# Patient Record
Sex: MALE | Race: OTHER | NOT HISPANIC OR LATINO
[De-identification: names, ages, dates, MRNs, and addresses within clinical notes are randomized per-mention and may not be internally consistent; named-entity substitution may affect disease eponyms.]

---

## 2018-04-01 PROBLEM — Z00.00 ENCOUNTER FOR PREVENTIVE HEALTH EXAMINATION: Status: ACTIVE | Noted: 2018-04-01

## 2018-05-02 ENCOUNTER — APPOINTMENT (OUTPATIENT)
Dept: UROLOGY | Facility: CLINIC | Age: 68
End: 2018-05-02
Payer: COMMERCIAL

## 2018-05-02 VITALS
WEIGHT: 175 LBS | DIASTOLIC BLOOD PRESSURE: 66 MMHG | BODY MASS INDEX: 26.52 KG/M2 | SYSTOLIC BLOOD PRESSURE: 122 MMHG | HEART RATE: 68 BPM | HEIGHT: 68 IN

## 2018-05-02 DIAGNOSIS — Z12.5 ENCOUNTER FOR SCREENING FOR MALIGNANT NEOPLASM OF PROSTATE: ICD-10-CM

## 2018-05-02 DIAGNOSIS — R39.12 POOR URINARY STREAM: ICD-10-CM

## 2018-05-02 DIAGNOSIS — F41.9 ANXIETY DISORDER, UNSPECIFIED: ICD-10-CM

## 2018-05-02 DIAGNOSIS — Z87.891 PERSONAL HISTORY OF NICOTINE DEPENDENCE: ICD-10-CM

## 2018-05-02 DIAGNOSIS — Z78.9 OTHER SPECIFIED HEALTH STATUS: ICD-10-CM

## 2018-05-02 PROCEDURE — 99204 OFFICE O/P NEW MOD 45 MIN: CPT

## 2018-05-02 RX ORDER — BUSPIRONE HYDROCHLORIDE 10 MG/1
10 TABLET ORAL
Qty: 60 | Refills: 0 | Status: DISCONTINUED | COMMUNITY
Start: 2018-03-01

## 2018-05-02 RX ORDER — SIMVASTATIN 40 MG/1
40 TABLET, FILM COATED ORAL
Qty: 30 | Refills: 0 | Status: DISCONTINUED | COMMUNITY
Start: 2018-01-15

## 2018-05-02 RX ORDER — FINASTERIDE 1 MG/1
TABLET ORAL
Refills: 0 | Status: ACTIVE | COMMUNITY

## 2018-05-02 RX ORDER — TAMSULOSIN HYDROCHLORIDE 0.4 MG/1
0.4 CAPSULE ORAL
Qty: 30 | Refills: 0 | Status: DISCONTINUED | COMMUNITY
Start: 2018-01-26

## 2018-05-02 RX ORDER — TAMSULOSIN HYDROCHLORIDE 0.4 MG/1
CAPSULE ORAL
Refills: 0 | Status: ACTIVE | COMMUNITY

## 2018-05-02 RX ORDER — ATENOLOL 50 MG/1
50 TABLET ORAL
Qty: 60 | Refills: 0 | Status: DISCONTINUED | COMMUNITY
Start: 2018-01-21

## 2018-05-02 RX ORDER — OMEPRAZOLE 40 MG/1
40 CAPSULE, DELAYED RELEASE ORAL
Qty: 30 | Refills: 0 | Status: DISCONTINUED | COMMUNITY
Start: 2018-02-20

## 2018-05-02 RX ORDER — FINASTERIDE 5 MG/1
5 TABLET, FILM COATED ORAL
Qty: 30 | Refills: 0 | Status: DISCONTINUED | COMMUNITY
Start: 2018-01-15

## 2018-05-02 NOTE — PHYSICAL EXAM
[General Appearance - Well Developed] : well developed [General Appearance - Well Nourished] : well nourished [Normal Appearance] : normal appearance [Well Groomed] : well groomed [General Appearance - In No Acute Distress] : no acute distress [Edema] : no peripheral edema [Respiration, Rhythm And Depth] : normal respiratory rhythm and effort [Exaggerated Use Of Accessory Muscles For Inspiration] : no accessory muscle use [Abdomen Soft] : soft [Abdomen Tenderness] : non-tender [Costovertebral Angle Tenderness] : no ~M costovertebral angle tenderness [No Prostate Nodules] : no prostate nodules [Prostate Size ___ gm] : prostate size [unfilled] gm [Normal Station and Gait] : the gait and station were normal for the patient's age [Skin Color & Pigmentation] : normal skin color and pigmentation [] : no rash [No Focal Deficits] : no focal deficits [Oriented To Time, Place, And Person] : oriented to person, place, and time [Affect] : the affect was normal [Mood] : the mood was normal [Not Anxious] : not anxious

## 2018-05-02 NOTE — HISTORY OF PRESENT ILLNESS
[FreeTextEntry1] : This is a 67 year male who presents for evaluation of BPH with LUTS.  Was told prostate was large by GI who saw on colonoscopy. Has taken flomax and finasteride for years. \par \par He does complain of Nocturia, frequency and urgency and weak stream.\par \par IPSS: Total = 16\par 1.incomplete emptyin\par 2. frequency:3\par 3. intermittency:2\par 4. urgency:4\par 5. weak stream: 3\par 6. Strainin\par 7. nocturia:1\par \par QOL: Mixed\par \par No family history of proatate cancer\par does not smoke\par no sexual dysfunction

## 2018-05-02 NOTE — LETTER BODY
[Dear  ___] : Dear  [unfilled], [Consult Letter:] : I had the pleasure of evaluating your patient, [unfilled]. [Please see my note below.] : Please see my note below. [Consult Closing:] : Thank you very much for allowing me to participate in the care of this patient.  If you have any questions, please do not hesitate to contact me. [Sincerely,] : Sincerely, [FreeTextEntry3] : Royal Franz

## 2018-05-30 ENCOUNTER — APPOINTMENT (OUTPATIENT)
Dept: UROLOGY | Facility: CLINIC | Age: 68
End: 2018-05-30
Payer: COMMERCIAL

## 2018-05-30 DIAGNOSIS — N40.0 BENIGN PROSTATIC HYPERPLASIA WITHOUT LOWER URINARY TRACT SYMPMS: ICD-10-CM

## 2018-05-30 DIAGNOSIS — R35.1 NOCTURIA: ICD-10-CM

## 2018-05-30 DIAGNOSIS — N31.8 OTHER NEUROMUSCULAR DYSFUNCTION OF BLADDER: ICD-10-CM

## 2018-05-30 PROCEDURE — 76872 US TRANSRECTAL: CPT

## 2018-06-06 ENCOUNTER — APPOINTMENT (OUTPATIENT)
Dept: UROLOGY | Facility: CLINIC | Age: 68
End: 2018-06-06

## 2018-06-07 PROBLEM — R35.1 NOCTURIA MORE THAN TWICE PER NIGHT: Status: ACTIVE | Noted: 2018-05-02

## 2018-06-07 PROBLEM — N31.8 FREQUENCY-URGENCY SYNDROME: Status: ACTIVE | Noted: 2018-05-02

## 2018-06-07 PROBLEM — N40.0 ENLARGED PROSTATE: Status: ACTIVE | Noted: 2018-05-02

## 2018-07-31 ENCOUNTER — APPOINTMENT (OUTPATIENT)
Dept: UROLOGY | Facility: CLINIC | Age: 68
End: 2018-07-31

## 2018-11-04 ENCOUNTER — INPATIENT (INPATIENT)
Facility: HOSPITAL | Age: 68
LOS: 1 days | Discharge: HOME | End: 2018-11-06
Attending: INTERNAL MEDICINE | Admitting: INTERNAL MEDICINE
Payer: COMMERCIAL

## 2018-11-04 VITALS
DIASTOLIC BLOOD PRESSURE: 88 MMHG | RESPIRATION RATE: 18 BRPM | TEMPERATURE: 98 F | SYSTOLIC BLOOD PRESSURE: 165 MMHG | HEART RATE: 53 BPM | OXYGEN SATURATION: 99 % | WEIGHT: 175.05 LBS | HEIGHT: 68 IN

## 2018-11-04 DIAGNOSIS — N40.0 BENIGN PROSTATIC HYPERPLASIA WITHOUT LOWER URINARY TRACT SYMPTOMS: ICD-10-CM

## 2018-11-04 DIAGNOSIS — F32.9 MAJOR DEPRESSIVE DISORDER, SINGLE EPISODE, UNSPECIFIED: ICD-10-CM

## 2018-11-04 DIAGNOSIS — I10 ESSENTIAL (PRIMARY) HYPERTENSION: ICD-10-CM

## 2018-11-04 DIAGNOSIS — R07.9 CHEST PAIN, UNSPECIFIED: ICD-10-CM

## 2018-11-04 LAB
ALBUMIN SERPL ELPH-MCNC: 4.7 G/DL — SIGNIFICANT CHANGE UP (ref 3.5–5.2)
ALP SERPL-CCNC: 72 U/L — SIGNIFICANT CHANGE UP (ref 30–115)
ALT FLD-CCNC: 37 U/L — SIGNIFICANT CHANGE UP (ref 0–41)
ANION GAP SERPL CALC-SCNC: 14 MMOL/L — SIGNIFICANT CHANGE UP (ref 7–14)
AST SERPL-CCNC: 29 U/L — SIGNIFICANT CHANGE UP (ref 0–41)
BILIRUB SERPL-MCNC: 0.4 MG/DL — SIGNIFICANT CHANGE UP (ref 0.2–1.2)
BUN SERPL-MCNC: 17 MG/DL — SIGNIFICANT CHANGE UP (ref 10–20)
CALCIUM SERPL-MCNC: 9.2 MG/DL — SIGNIFICANT CHANGE UP (ref 8.5–10.1)
CHLORIDE SERPL-SCNC: 103 MMOL/L — SIGNIFICANT CHANGE UP (ref 98–110)
CO2 SERPL-SCNC: 26 MMOL/L — SIGNIFICANT CHANGE UP (ref 17–32)
CREAT SERPL-MCNC: 1.1 MG/DL — SIGNIFICANT CHANGE UP (ref 0.7–1.5)
GLUCOSE SERPL-MCNC: 138 MG/DL — HIGH (ref 70–99)
HCT VFR BLD CALC: 44 % — SIGNIFICANT CHANGE UP (ref 42–52)
HGB BLD-MCNC: 13.7 G/DL — LOW (ref 14–18)
MCHC RBC-ENTMCNC: 22.4 PG — LOW (ref 27–31)
MCHC RBC-ENTMCNC: 31.1 G/DL — LOW (ref 32–37)
MCV RBC AUTO: 71.9 FL — LOW (ref 80–94)
NRBC # BLD: 0 /100 WBCS — SIGNIFICANT CHANGE UP (ref 0–0)
PLATELET # BLD AUTO: 200 K/UL — SIGNIFICANT CHANGE UP (ref 130–400)
POTASSIUM SERPL-MCNC: 4.6 MMOL/L — SIGNIFICANT CHANGE UP (ref 3.5–5)
POTASSIUM SERPL-SCNC: 4.6 MMOL/L — SIGNIFICANT CHANGE UP (ref 3.5–5)
PROT SERPL-MCNC: 7.4 G/DL — SIGNIFICANT CHANGE UP (ref 6–8)
RBC # BLD: 6.12 M/UL — HIGH (ref 4.7–6.1)
RBC # FLD: 15.9 % — HIGH (ref 11.5–14.5)
SODIUM SERPL-SCNC: 143 MMOL/L — SIGNIFICANT CHANGE UP (ref 135–146)
TROPONIN T SERPL-MCNC: <0.01 NG/ML — SIGNIFICANT CHANGE UP
WBC # BLD: 6.57 K/UL — SIGNIFICANT CHANGE UP (ref 4.8–10.8)
WBC # FLD AUTO: 6.57 K/UL — SIGNIFICANT CHANGE UP (ref 4.8–10.8)

## 2018-11-04 RX ORDER — ASPIRIN/CALCIUM CARB/MAGNESIUM 324 MG
325 TABLET ORAL DAILY
Qty: 0 | Refills: 0 | Status: DISCONTINUED | OUTPATIENT
Start: 2018-11-04 | End: 2018-11-05

## 2018-11-04 RX ORDER — NITROGLYCERIN 6.5 MG
0.4 CAPSULE, EXTENDED RELEASE ORAL
Qty: 0 | Refills: 0 | Status: COMPLETED | OUTPATIENT
Start: 2018-11-04 | End: 2018-11-05

## 2018-11-04 RX ADMIN — Medication 0.4 MILLIGRAM(S): at 22:18

## 2018-11-04 RX ADMIN — Medication 325 MILLIGRAM(S): at 22:18

## 2018-11-04 NOTE — ED ADULT TRIAGE NOTE - CHIEF COMPLAINT QUOTE
I am having chest pain. It starts in my left shoulder and moves into the middle.  This has been going on for months and tonight the pain became severe.

## 2018-11-04 NOTE — ED ADULT NURSE NOTE - OBJECTIVE STATEMENT
chest pain starting in left arm and radiating to middle of chest. Pain began months ago and is intermittant.  Tonight pain was worse.

## 2018-11-04 NOTE — ED PROVIDER NOTE - OBJECTIVE STATEMENT
68y male with h/o HTN, DLD, GERD, and anxiety with remote smoking hx, no fhx cad, no PE rf, presents with years of chest pain, pain mostly in left shoulder, occasionally with numbness in left arm and left leg, again for years, numbness attributed to pinched nerves in neck and back, however c/o worsening pain with occasional SOB, had stress test which was apparently abnormal and given rx for CCTA as outpatient, 3 hours ago felt the pain again, 7/10, asked his wife to bring him to hospital, still has the pain, no sob, no orthopnea, no change in ET, no cough, no feer, no melena

## 2018-11-04 NOTE — H&P ADULT - NSHPPHYSICALEXAM_GEN_ALL_CORE
GENERAL:  69y/o Male NAD, resting comfortably.  HEAD:  Atraumatic, Normocephalic  EYES: EOMI, PERRLA, conjunctiva and sclera clear  NECK: Supple, No JVD, no cervical lymphadenopathy, non-tender  CHEST/LUNG: Clear to auscultation bilaterally; No wheeze, rhonchi, or rales  HEART: Regular rate and rhythm; S1&S2  ABDOMEN: Soft, Nontender, Nondistended x 4 quadrants; Bowel sounds present  EXTREMITIES:   Peripheral Pulses Present, No clubbing, no cyanosis, or no edema, no calf tenderness  PSYCH: AAOx3, cooperative, appropriate  NEUROLOGY: WNL  SKIN: WNL

## 2018-11-04 NOTE — ED PROVIDER NOTE - PHYSICAL EXAMINATION
Vital Signs: I have reviewed the initial vital signs.  Constitutional: well-nourished, appears stated age, no acute distress  HEENT: nc/at, perrla, conj pink mmm  Neck: supple no JVD  Cardiovascular: RRR no m/r/g  Respiratory: CTA no w/r/r  Gastrointestinal: +bs, snt/nd  Musculoskeletal: FROM x 4 pulses equal  Integumentary: warm, dry, no rash  Neurologic: awake, alert, cranial nerves II-XII grossly intact, extremities’ motor and sensory functions grossly intact  Psychiatric: appropriate mood, appropriate affect

## 2018-11-04 NOTE — H&P ADULT - NSHPLABSRESULTS_GEN_ALL_CORE
13.7   6.57  )-----------( 200      ( 04 Nov 2018 22:20 )             44.0       11-04    143  |  103  |  17  ----------------------------<  138<H>  4.6   |  26  |  1.1    Ca    9.2      04 Nov 2018 22:20    TPro  7.4  /  Alb  4.7  /  TBili  0.4  /  DBili  x   /  AST  29  /  ALT  37  /  AlkPhos  72  11-04                      Lactate Trend      CARDIAC MARKERS ( 04 Nov 2018 22:20 )  x     / <0.01 ng/mL / x     / x     / x            CAPILLARY BLOOD GLUCOSE

## 2018-11-04 NOTE — ED ADULT NURSE NOTE - NSIMPLEMENTINTERV_GEN_ALL_ED
Implemented All Universal Safety Interventions:  [place] to call system. Call bell, personal items and telephone within reach. Instruct patient to call for assistance. Room bathroom lighting operational. Non-slip footwear when patient is off stretcher. Physically safe environment: no spills, clutter or unnecessary equipment. Stretcher in lowest position, wheels locked, appropriate side rails in place.

## 2018-11-04 NOTE — ED PROVIDER NOTE - MEDICAL DECISION MAKING DETAILS
Pt with CP, per family with recent abnormal stress test referred for CCTA, PE as above, labs and studies reviewed, admitted to telemetry

## 2018-11-04 NOTE — H&P ADULT - HISTORY OF PRESENT ILLNESS
· Chief Complaint: The patient is a 68y Male complaining of chest pain.	  · HPI Objective Statement: 68y male with h/o HTN, DLD, GERD, and anxiety with remote smoking hx, no fhx cad, no PE rf, presents with years of chest pain, pain mostly in left shoulder, occasionally with numbness in left arm and left leg, again for years, numbness attributed to pinched nerves in neck and back, however c/o worsening pain with occasional SOB, had stress test which was apparently abnormal and given rx for CCTA as outpatient, 3 hours ago felt the pain again, 7/10, asked his wife to bring him to hospital, still has the pain, no sob, no orthopnea, no change in ET, no cough, no feer, no melena

## 2018-11-05 LAB
CK MB CFR SERPL CALC: 1.1 NG/ML — SIGNIFICANT CHANGE UP (ref 0.6–6.3)
CK MB CFR SERPL CALC: 1.6 NG/ML — SIGNIFICANT CHANGE UP (ref 0.6–6.3)
CK SERPL-CCNC: 134 U/L — SIGNIFICANT CHANGE UP (ref 0–225)
CK SERPL-CCNC: 163 U/L — SIGNIFICANT CHANGE UP (ref 0–225)
TROPONIN T SERPL-MCNC: <0.01 NG/ML — SIGNIFICANT CHANGE UP
TROPONIN T SERPL-MCNC: <0.01 NG/ML — SIGNIFICANT CHANGE UP

## 2018-11-05 PROCEDURE — 93880 EXTRACRANIAL BILAT STUDY: CPT | Mod: 26

## 2018-11-05 RX ORDER — PANTOPRAZOLE SODIUM 20 MG/1
40 TABLET, DELAYED RELEASE ORAL ONCE
Qty: 0 | Refills: 0 | Status: COMPLETED | OUTPATIENT
Start: 2018-11-05 | End: 2018-11-05

## 2018-11-05 RX ORDER — ASPIRIN/CALCIUM CARB/MAGNESIUM 324 MG
325 TABLET ORAL DAILY
Qty: 0 | Refills: 0 | Status: DISCONTINUED | OUTPATIENT
Start: 2018-11-05 | End: 2018-11-06

## 2018-11-05 RX ORDER — NITROGLYCERIN 6.5 MG
0.4 CAPSULE, EXTENDED RELEASE ORAL ONCE
Qty: 0 | Refills: 0 | Status: COMPLETED | OUTPATIENT
Start: 2018-11-05 | End: 2018-11-05

## 2018-11-05 RX ORDER — HEPARIN SODIUM 5000 [USP'U]/ML
5000 INJECTION INTRAVENOUS; SUBCUTANEOUS EVERY 12 HOURS
Qty: 0 | Refills: 0 | Status: DISCONTINUED | OUTPATIENT
Start: 2018-11-05 | End: 2018-11-06

## 2018-11-05 RX ORDER — PANTOPRAZOLE SODIUM 20 MG/1
40 TABLET, DELAYED RELEASE ORAL
Qty: 0 | Refills: 0 | Status: DISCONTINUED | OUTPATIENT
Start: 2018-11-05 | End: 2018-11-06

## 2018-11-05 RX ORDER — ALPRAZOLAM 0.25 MG
1 TABLET ORAL ONCE
Qty: 0 | Refills: 0 | Status: DISCONTINUED | OUTPATIENT
Start: 2018-11-05 | End: 2018-11-05

## 2018-11-05 RX ORDER — ACETAMINOPHEN 500 MG
650 TABLET ORAL EVERY 8 HOURS
Qty: 0 | Refills: 0 | Status: DISCONTINUED | OUTPATIENT
Start: 2018-11-05 | End: 2018-11-06

## 2018-11-05 RX ADMIN — Medication 325 MILLIGRAM(S): at 11:50

## 2018-11-05 RX ADMIN — Medication 1 MILLIGRAM(S): at 12:22

## 2018-11-05 RX ADMIN — PANTOPRAZOLE SODIUM 40 MILLIGRAM(S): 20 TABLET, DELAYED RELEASE ORAL at 09:10

## 2018-11-05 RX ADMIN — Medication 30 MILLILITER(S): at 09:00

## 2018-11-05 RX ADMIN — Medication 0.4 MILLIGRAM(S): at 08:21

## 2018-11-05 RX ADMIN — Medication 0.4 MILLIGRAM(S): at 08:36

## 2018-11-05 RX ADMIN — HEPARIN SODIUM 5000 UNIT(S): 5000 INJECTION INTRAVENOUS; SUBCUTANEOUS at 18:50

## 2018-11-05 NOTE — CONSULT NOTE ADULT - SUBJECTIVE AND OBJECTIVE BOX
CARDIOLOGY CONSULT NOTE     CHIEF COMPLAINT/REASON FOR CONSULT:    HPI:  · Chief Complaint: The patient is a 68y Male complaining of chest pain.	  · HPI Objective Statement: 68y male with h/o HTN, DLD, GERD, and anxiety with remote smoking hx, no fhx cad, no PE rf, presents with years of chest pain, pain mostly in left shoulder, occasionally with numbness in left arm and left leg, again for years, numbness attributed to pinched nerves in neck and back, however c/o worsening pain with occasional SOB, had stress test which was apparently abnormal and given rx for CCTA as outpatient, 3 hours ago felt the pain again, 7/10, asked his wife to bring him to hospital,  no sob, no orthopnea, no change in ET, no cough, no feer, no melena (04 Nov 2018 23:53)      PAST MEDICAL & SURGICAL HISTORY:  Depression  Hypertension  BPH (benign prostatic hyperplasia)      Cardiac Risks:   [ x]HTN, [x ] DM, [ ] Smoking, [ ] FH,  [ x] Lipids        MEDICATIONS:  MEDICATIONS  (STANDING):  aspirin 325 milliGRAM(s) Oral daily  heparin  Injectable 5000 Unit(s) SubCutaneous every 12 hours  pantoprazole    Tablet 40 milliGRAM(s) Oral before breakfast      FAMILY HISTORY:      SOCIAL HISTORY:      [ ] Marital status    Allergies    No Known Allergies        	    REVIEW OF SYSTEMS:  CONSTITUTIONAL: No fever, weight loss, or fatigue  EYES: No eye pain, visual disturbances, or discharge  ENMT:  No difficulty hearing, tinnitus, vertigo; No sinus or throat pain  NECK: No pain or stiffness  RESPIRATORY: No cough, wheezing, chills or hemoptysis; No Shortness of Breath  CARDIOVASCULAR: No chest pain, palpitations, passing out, dizziness, or leg swelling  GASTROINTESTINAL: No abdominal or epigastric pain. No nausea, vomiting, or hematemesis; No diarrhea or constipation. No melena or hematochezia.  GENITOURINARY: No dysuria, frequency, hematuria, or incontinence  NEUROLOGICAL: No headaches, memory loss, loss of strength, numbness, or tremors  SKIN: No itching, burning, rashes, or lesions   	      PHYSICAL EXAM:  T(C): 36 (11-05-18 @ 08:21), Max: 36.6 (11-04-18 @ 21:14)  HR: 56 (11-05-18 @ 08:51) (47 - 60)  BP: 159/89 (11-05-18 @ 08:51) (130/78 - 196/96)  RR: 16 (11-05-18 @ 08:21) (16 - 18)  SpO2: 100% (11-05-18 @ 06:01) (96% - 100%)  Wt(kg): --  I&O's Summary    04 Nov 2018 07:01  -  05 Nov 2018 07:00  --------------------------------------------------------  IN: 0 mL / OUT: 1500 mL / NET: -1500 mL        Appearance: Normal	  Psychiatry: A & O x 3, Mood & affect appropriate  HEENT:   Normal oral mucosa, PERRL, EOMI	  Lymphatic: No lymphadenopathy  Cardiovascular: Normal S1 S2,RRR, No JVD, No murmurs  Respiratory: Lungs clear to auscultation	  Gastrointestinal:  Soft, Non-tender, + BS	  Skin: No rashes, No ecchymoses, No cyanosis	  Neurologic: Non-focal  Extremities: Normal range of motion, No clubbing, cyanosis or edema  Vascular: Peripheral pulses palpable 2+ bilaterally      ECG:  	sb    	  LABS:	 	    CARDIAC MARKERS:                                    13.7   6.57  )-----------( 200      ( 04 Nov 2018 22:20 )             44.0     11-04    143  |  103  |  17  ----------------------------<  138<H>  4.6   |  26  |  1.1    Ca    9.2      04 Nov 2018 22:20    TPro  7.4  /  Alb  4.7  /  TBili  0.4  /  DBili  x   /  AST  29  /  ALT  37  /  AlkPhos  72  11-04

## 2018-11-06 ENCOUNTER — TRANSCRIPTION ENCOUNTER (OUTPATIENT)
Age: 68
End: 2018-11-06

## 2018-11-06 VITALS — HEIGHT: 68 IN | WEIGHT: 174.61 LBS

## 2018-11-06 LAB — GLUCOSE BLDC GLUCOMTR-MCNC: 183 MG/DL — HIGH (ref 70–99)

## 2018-11-06 RX ORDER — ASPIRIN/CALCIUM CARB/MAGNESIUM 324 MG
1 TABLET ORAL
Qty: 0 | Refills: 0 | DISCHARGE
Start: 2018-11-06

## 2018-11-06 RX ADMIN — PANTOPRAZOLE SODIUM 40 MILLIGRAM(S): 20 TABLET, DELAYED RELEASE ORAL at 05:49

## 2018-11-06 RX ADMIN — HEPARIN SODIUM 5000 UNIT(S): 5000 INJECTION INTRAVENOUS; SUBCUTANEOUS at 05:48

## 2018-11-06 NOTE — DISCHARGE NOTE ADULT - PATIENT PORTAL LINK FT
You can access the [facility] Patient Portal, offered by [facility], by registering with the following website: [email or web address]

## 2018-11-06 NOTE — DISCHARGE NOTE ADULT - HOSPITAL COURSE
· Chief Complaint: The patient is a 68y Male complaining of chest pain.	  · HPI Objective Statement: 68y male with h/o HTN, DLD, GERD, and anxiety with remote smoking hx, no fhx cad, no PE rf, presents with years of chest pain, pain mostly in left shoulder, occasionally with numbness in left arm and left leg, again for years, numbness attributed to pinched nerves in neck and back, however c/o worsening pain with occasional SOB, had stress test which was apparently abnormal and given rx for CCTA as outpatient, 3 hours ago felt the pain again, 7/10, asked his wife to bring him to hospital, still has the pain, no sob, no orthopnea, no change in ET, no cough, no feer, no melena	    PAST MEDICAL/SURGICAL/FAMILY/SOCIAL HISTORY:   Past Medical History:  BPH (benign prostatic hyperplasia)    Depression    Hypertension.    Tobacco Usage:  · Tobacco Usage	Former smoker	    ALLERGIES AND HOME MEDICATIONS:   Allergies:        Allergies:  	No Known Allergies:     Home Medications:   * Patient Currently Takes Medications as of 04-Nov-2018 21:28 documented in Structured Notes  · 	simvastatin 40 mg oral tablet: Last Dose Taken:  , 1 tab(s) orally once a day (at bedtime)  · 	tamsulosin 0.4 mg oral capsule: Last Dose Taken:  , 1 cap(s) orally once a day  · 	finasteride 5 mg oral tablet: Last Dose Taken:  , 1 tab(s) orally once a day  · 	atenolol 50 mg oral tablet: Last Dose Taken:  , 1 tab(s) orally once a day  · 	omeprazole 40 mg oral delayed release capsule: Last Dose Taken:  , 1 cap(s) orally once a day  · 	busPIRone 10 mg oral tablet: Last Dose Taken:  , 1 tab(s) orally 2 times a day        patient Had CTA --moderate Lad occlusion    patient will see patient in [place]

## 2018-11-06 NOTE — PROGRESS NOTE ADULT - SUBJECTIVE AND OBJECTIVE BOX
KAIDEN SAGE  68y  Male    Patient is a 68y old  Male who presents with a chief complaint of cp (05 Nov 2018 10:32)    ALLERGIES:  No Known Allergies      INTERVAL HPI/OVERNIGHT EVENTS:    VITALS:  T(F): 96.2 (11-06-18 @ 06:00), Max: 97.8 (11-05-18 @ 16:07)  HR: 56 (11-06-18 @ 06:00) (51 - 56)  BP: 148/56 (11-06-18 @ 06:00) (126/69 - 160/85)  RR: 16 (11-06-18 @ 06:00) (16 - 17)  SpO2: 96% (11-05-18 @ 16:07) (96% - 96%)    LABS:  11-04    143  |  103  |  17  ----------------------------<  138<H>  4.6   |  26  |  1.1    Ca    9.2      04 Nov 2018 22:20    TPro  7.4  /  Alb  4.7  /  TBili  0.4  /  DBili  x   /  AST  29  /  ALT  37  /  AlkPhos  72  11-04    MICROBIOLOGY:    MEDICATION:  acetaminophen   Tablet .. 650 milliGRAM(s) Oral every 8 hours PRN  aluminum hydroxide/magnesium hydroxide/simethicone Suspension 30 milliLiter(s) Oral every 4 hours PRN  aspirin 325 milliGRAM(s) Oral daily  heparin  Injectable 5000 Unit(s) SubCutaneous every 12 hours  pantoprazole    Tablet 40 milliGRAM(s) Oral before breakfast    RADIOLOGY & ADDITIONAL TESTS:

## 2018-11-06 NOTE — DISCHARGE NOTE ADULT - MEDICATION SUMMARY - MEDICATIONS TO TAKE
I will START or STAY ON the medications listed below when I get home from the hospital:    finasteride 5 mg oral tablet  -- 1 tab(s) by mouth once a day  -- Indication: For BPH (benign prostatic hyperplasia)    aspirin 325 mg oral tablet  -- 1 tab(s) by mouth once a day  -- Indication: For Cad    tamsulosin 0.4 mg oral capsule  -- 1 cap(s) by mouth once a day  -- Indication: For BPH (benign prostatic hyperplasia)    simvastatin 40 mg oral tablet  -- 1 tab(s) by mouth once a day (at bedtime)  -- Indication: For Cholesterol    busPIRone 10 mg oral tablet  -- 1 tab(s) by mouth 2 times a day  -- Indication: For anxiety    atenolol 50 mg oral tablet  -- 1 tab(s) by mouth once a day  -- Indication: For Hypertension    omeprazole 40 mg oral delayed release capsule  -- 1 cap(s) by mouth once a day  -- Indication: For gerd

## 2018-11-06 NOTE — DISCHARGE NOTE ADULT - CARE PLAN
Principal Discharge DX:	Coronary artery disease with other form of angina pectoris  Goal:	advised to see cardiology  Assessment and plan of treatment:	patient have moderate LAD occlusion

## 2018-11-09 DIAGNOSIS — N40.0 BENIGN PROSTATIC HYPERPLASIA WITHOUT LOWER URINARY TRACT SYMPTOMS: ICD-10-CM

## 2018-11-09 DIAGNOSIS — Z87.891 PERSONAL HISTORY OF NICOTINE DEPENDENCE: ICD-10-CM

## 2018-11-09 DIAGNOSIS — K21.9 GASTRO-ESOPHAGEAL REFLUX DISEASE WITHOUT ESOPHAGITIS: ICD-10-CM

## 2018-11-09 DIAGNOSIS — I25.118 ATHEROSCLEROTIC HEART DISEASE OF NATIVE CORONARY ARTERY WITH OTHER FORMS OF ANGINA PECTORIS: ICD-10-CM

## 2018-11-09 DIAGNOSIS — R07.9 CHEST PAIN, UNSPECIFIED: ICD-10-CM

## 2018-11-09 DIAGNOSIS — I10 ESSENTIAL (PRIMARY) HYPERTENSION: ICD-10-CM

## 2018-11-09 DIAGNOSIS — F32.9 MAJOR DEPRESSIVE DISORDER, SINGLE EPISODE, UNSPECIFIED: ICD-10-CM

## 2018-11-09 DIAGNOSIS — F41.9 ANXIETY DISORDER, UNSPECIFIED: ICD-10-CM

## 2018-11-09 DIAGNOSIS — E78.5 HYPERLIPIDEMIA, UNSPECIFIED: ICD-10-CM

## 2020-07-25 ENCOUNTER — INPATIENT (INPATIENT)
Facility: HOSPITAL | Age: 70
LOS: 1 days | Discharge: HOME | End: 2020-07-27
Attending: HOSPITALIST | Admitting: HOSPITALIST
Payer: MEDICARE

## 2020-07-25 VITALS
WEIGHT: 175.05 LBS | SYSTOLIC BLOOD PRESSURE: 172 MMHG | TEMPERATURE: 98 F | DIASTOLIC BLOOD PRESSURE: 92 MMHG | OXYGEN SATURATION: 96 % | RESPIRATION RATE: 18 BRPM | HEART RATE: 75 BPM

## 2020-07-25 LAB
ALBUMIN SERPL ELPH-MCNC: 4.8 G/DL — SIGNIFICANT CHANGE UP (ref 3.5–5.2)
ALP SERPL-CCNC: 68 U/L — SIGNIFICANT CHANGE UP (ref 30–115)
ALT FLD-CCNC: 52 U/L — HIGH (ref 0–41)
ANION GAP SERPL CALC-SCNC: 13 MMOL/L — SIGNIFICANT CHANGE UP (ref 7–14)
AST SERPL-CCNC: 29 U/L — SIGNIFICANT CHANGE UP (ref 0–41)
BASOPHILS # BLD AUTO: 0.04 K/UL — SIGNIFICANT CHANGE UP (ref 0–0.2)
BASOPHILS NFR BLD AUTO: 0.7 % — SIGNIFICANT CHANGE UP (ref 0–1)
BILIRUB SERPL-MCNC: 0.6 MG/DL — SIGNIFICANT CHANGE UP (ref 0.2–1.2)
BUN SERPL-MCNC: 14 MG/DL — SIGNIFICANT CHANGE UP (ref 10–20)
CALCIUM SERPL-MCNC: 9.5 MG/DL — SIGNIFICANT CHANGE UP (ref 8.5–10.1)
CHLORIDE SERPL-SCNC: 99 MMOL/L — SIGNIFICANT CHANGE UP (ref 98–110)
CO2 SERPL-SCNC: 26 MMOL/L — SIGNIFICANT CHANGE UP (ref 17–32)
CREAT SERPL-MCNC: 1.1 MG/DL — SIGNIFICANT CHANGE UP (ref 0.7–1.5)
EOSINOPHIL # BLD AUTO: 0.1 K/UL — SIGNIFICANT CHANGE UP (ref 0–0.7)
EOSINOPHIL NFR BLD AUTO: 1.7 % — SIGNIFICANT CHANGE UP (ref 0–8)
GLUCOSE SERPL-MCNC: 118 MG/DL — HIGH (ref 70–99)
HCT VFR BLD CALC: 43.2 % — SIGNIFICANT CHANGE UP (ref 42–52)
HGB BLD-MCNC: 13.4 G/DL — LOW (ref 14–18)
IMM GRANULOCYTES NFR BLD AUTO: 0.2 % — SIGNIFICANT CHANGE UP (ref 0.1–0.3)
LYMPHOCYTES # BLD AUTO: 1.23 K/UL — SIGNIFICANT CHANGE UP (ref 1.2–3.4)
LYMPHOCYTES # BLD AUTO: 21 % — SIGNIFICANT CHANGE UP (ref 20.5–51.1)
MAGNESIUM SERPL-MCNC: 2.3 MG/DL — SIGNIFICANT CHANGE UP (ref 1.8–2.4)
MCHC RBC-ENTMCNC: 22.6 PG — LOW (ref 27–31)
MCHC RBC-ENTMCNC: 31 G/DL — LOW (ref 32–37)
MCV RBC AUTO: 72.8 FL — LOW (ref 80–94)
MONOCYTES # BLD AUTO: 0.44 K/UL — SIGNIFICANT CHANGE UP (ref 0.1–0.6)
MONOCYTES NFR BLD AUTO: 7.5 % — SIGNIFICANT CHANGE UP (ref 1.7–9.3)
NEUTROPHILS # BLD AUTO: 4.05 K/UL — SIGNIFICANT CHANGE UP (ref 1.4–6.5)
NEUTROPHILS NFR BLD AUTO: 68.9 % — SIGNIFICANT CHANGE UP (ref 42.2–75.2)
NRBC # BLD: 0 /100 WBCS — SIGNIFICANT CHANGE UP (ref 0–0)
NT-PROBNP SERPL-SCNC: 65 PG/ML — SIGNIFICANT CHANGE UP (ref 0–300)
PLATELET # BLD AUTO: 188 K/UL — SIGNIFICANT CHANGE UP (ref 130–400)
POTASSIUM SERPL-MCNC: 4.2 MMOL/L — SIGNIFICANT CHANGE UP (ref 3.5–5)
POTASSIUM SERPL-SCNC: 4.2 MMOL/L — SIGNIFICANT CHANGE UP (ref 3.5–5)
PROT SERPL-MCNC: 7.9 G/DL — SIGNIFICANT CHANGE UP (ref 6–8)
RBC # BLD: 5.93 M/UL — SIGNIFICANT CHANGE UP (ref 4.7–6.1)
RBC # FLD: 15.2 % — HIGH (ref 11.5–14.5)
SODIUM SERPL-SCNC: 138 MMOL/L — SIGNIFICANT CHANGE UP (ref 135–146)
TROPONIN T SERPL-MCNC: <0.01 NG/ML — SIGNIFICANT CHANGE UP
WBC # BLD: 5.87 K/UL — SIGNIFICANT CHANGE UP (ref 4.8–10.8)
WBC # FLD AUTO: 5.87 K/UL — SIGNIFICANT CHANGE UP (ref 4.8–10.8)

## 2020-07-25 PROCEDURE — 93010 ELECTROCARDIOGRAM REPORT: CPT | Mod: 76

## 2020-07-25 PROCEDURE — 99223 1ST HOSP IP/OBS HIGH 75: CPT

## 2020-07-25 PROCEDURE — 71046 X-RAY EXAM CHEST 2 VIEWS: CPT | Mod: 26

## 2020-07-25 PROCEDURE — 99285 EMERGENCY DEPT VISIT HI MDM: CPT | Mod: CS

## 2020-07-25 RX ORDER — TAMSULOSIN HYDROCHLORIDE 0.4 MG/1
0.4 CAPSULE ORAL AT BEDTIME
Refills: 0 | Status: DISCONTINUED | OUTPATIENT
Start: 2020-07-25 | End: 2020-07-27

## 2020-07-25 RX ORDER — ROSUVASTATIN CALCIUM 5 MG/1
1 TABLET ORAL
Qty: 0 | Refills: 0 | DISCHARGE

## 2020-07-25 RX ORDER — ASPIRIN/CALCIUM CARB/MAGNESIUM 324 MG
81 TABLET ORAL DAILY
Refills: 0 | Status: DISCONTINUED | OUTPATIENT
Start: 2020-07-25 | End: 2020-07-27

## 2020-07-25 RX ORDER — ACETAMINOPHEN 500 MG
650 TABLET ORAL EVERY 6 HOURS
Refills: 0 | Status: DISCONTINUED | OUTPATIENT
Start: 2020-07-25 | End: 2020-07-27

## 2020-07-25 RX ORDER — ENOXAPARIN SODIUM 100 MG/ML
40 INJECTION SUBCUTANEOUS AT BEDTIME
Refills: 0 | Status: DISCONTINUED | OUTPATIENT
Start: 2020-07-25 | End: 2020-07-27

## 2020-07-25 RX ORDER — ATORVASTATIN CALCIUM 80 MG/1
20 TABLET, FILM COATED ORAL AT BEDTIME
Refills: 0 | Status: DISCONTINUED | OUTPATIENT
Start: 2020-07-25 | End: 2020-07-27

## 2020-07-25 RX ORDER — SIMVASTATIN 20 MG/1
1 TABLET, FILM COATED ORAL
Qty: 0 | Refills: 0 | DISCHARGE

## 2020-07-25 RX ORDER — GABAPENTIN 400 MG/1
100 CAPSULE ORAL THREE TIMES A DAY
Refills: 0 | Status: DISCONTINUED | OUTPATIENT
Start: 2020-07-25 | End: 2020-07-27

## 2020-07-25 RX ORDER — PANTOPRAZOLE SODIUM 20 MG/1
40 TABLET, DELAYED RELEASE ORAL
Refills: 0 | Status: DISCONTINUED | OUTPATIENT
Start: 2020-07-25 | End: 2020-07-27

## 2020-07-25 RX ORDER — LISINOPRIL 2.5 MG/1
10 TABLET ORAL DAILY
Refills: 0 | Status: DISCONTINUED | OUTPATIENT
Start: 2020-07-25 | End: 2020-07-26

## 2020-07-25 RX ORDER — LISINOPRIL 2.5 MG/1
10 TABLET ORAL ONCE
Refills: 0 | Status: COMPLETED | OUTPATIENT
Start: 2020-07-25 | End: 2020-07-25

## 2020-07-25 RX ORDER — FINASTERIDE 5 MG/1
1 TABLET, FILM COATED ORAL
Qty: 0 | Refills: 0 | DISCHARGE

## 2020-07-25 RX ORDER — CHLORHEXIDINE GLUCONATE 213 G/1000ML
1 SOLUTION TOPICAL DAILY
Refills: 0 | Status: DISCONTINUED | OUTPATIENT
Start: 2020-07-25 | End: 2020-07-27

## 2020-07-25 RX ORDER — ATENOLOL 25 MG/1
1 TABLET ORAL
Qty: 0 | Refills: 0 | DISCHARGE

## 2020-07-25 RX ORDER — CARVEDILOL PHOSPHATE 80 MG/1
12.5 CAPSULE, EXTENDED RELEASE ORAL EVERY 12 HOURS
Refills: 0 | Status: DISCONTINUED | OUTPATIENT
Start: 2020-07-25 | End: 2020-07-27

## 2020-07-25 RX ORDER — ASPIRIN/CALCIUM CARB/MAGNESIUM 324 MG
162 TABLET ORAL ONCE
Refills: 0 | Status: COMPLETED | OUTPATIENT
Start: 2020-07-25 | End: 2020-07-25

## 2020-07-25 RX ORDER — FINASTERIDE 5 MG/1
5 TABLET, FILM COATED ORAL DAILY
Refills: 0 | Status: DISCONTINUED | OUTPATIENT
Start: 2020-07-25 | End: 2020-07-27

## 2020-07-25 RX ORDER — TAMSULOSIN HYDROCHLORIDE 0.4 MG/1
1 CAPSULE ORAL
Qty: 0 | Refills: 0 | DISCHARGE

## 2020-07-25 RX ORDER — OMEPRAZOLE 10 MG/1
1 CAPSULE, DELAYED RELEASE ORAL
Qty: 0 | Refills: 0 | DISCHARGE

## 2020-07-25 RX ADMIN — Medication 7.5 MILLIGRAM(S): at 21:56

## 2020-07-25 RX ADMIN — Medication 162 MILLIGRAM(S): at 14:34

## 2020-07-25 RX ADMIN — LISINOPRIL 10 MILLIGRAM(S): 2.5 TABLET ORAL at 16:45

## 2020-07-25 RX ADMIN — GABAPENTIN 100 MILLIGRAM(S): 400 CAPSULE ORAL at 16:45

## 2020-07-25 RX ADMIN — ATORVASTATIN CALCIUM 20 MILLIGRAM(S): 80 TABLET, FILM COATED ORAL at 21:53

## 2020-07-25 RX ADMIN — ENOXAPARIN SODIUM 40 MILLIGRAM(S): 100 INJECTION SUBCUTANEOUS at 21:55

## 2020-07-25 RX ADMIN — CARVEDILOL PHOSPHATE 12.5 MILLIGRAM(S): 80 CAPSULE, EXTENDED RELEASE ORAL at 18:03

## 2020-07-25 RX ADMIN — GABAPENTIN 100 MILLIGRAM(S): 400 CAPSULE ORAL at 21:53

## 2020-07-25 RX ADMIN — TAMSULOSIN HYDROCHLORIDE 0.4 MILLIGRAM(S): 0.4 CAPSULE ORAL at 21:53

## 2020-07-25 NOTE — ED PROVIDER NOTE - OBJECTIVE STATEMENT
Pt is a 69 year old male with PMH HTN, HLD presents to ED with c/o chest pain. Pt states chest pain has been ongoing for over (1) year, seen by cardiology multiple times. Pain is L sided, substernal, radiating to L shoulder, with no alleviating or aggravating factors. Pt had CCTA performed back in 2018 with moderate stenosis of LAD. Pt denies fever, chills, bodyaches, sob, abdominal pain, NVCD. Of note pt received (2) NTG and 162mg ASA by EMS which subsided chest pain

## 2020-07-25 NOTE — H&P ADULT - ATTENDING COMMENTS
Patient was seen and examined with the admitting resident.     69 yr complaining of left should pain with pain radiating to the left elbow. Pain is chronic in nature for past 3 yrs.   Spurling test (+).  Patient able to go up 2 flights of stairs with no issues. Trop neg, EKG: NSR

## 2020-07-25 NOTE — H&P ADULT - NSHPLABSRESULTS_GEN_ALL_CORE
13.4   5.87  )-----------( 188      ( 25 Jul 2020 12:20 )             43.2       07-25    138  |  99  |  14  ----------------------------<  118<H>  4.2   |  26  |  1.1    Ca    9.5      25 Jul 2020 12:20  Mg     2.3     07-25    TPro  7.9  /  Alb  4.8  /  TBili  0.6  /  DBili  x   /  AST  29  /  ALT  52<H>  /  AlkPhos  68  07-25                      CARDIAC MARKERS ( 25 Jul 2020 12:20 )  x     / <0.01 ng/mL / x     / x     / x            CAPILLARY BLOOD GLUCOSE 13.4   5.87  )-----------( 188      ( 25 Jul 2020 12:20 )             43.2       07-25    138  |  99  |  14  ----------------------------<  118<H>  4.2   |  26  |  1.1    Ca    9.5      25 Jul 2020 12:20  Mg     2.3     07-25    TPro  7.9  /  Alb  4.8  /  TBili  0.6  /  DBili  x   /  AST  29  /  ALT  52<H>  /  AlkPhos  68  07-25              CARDIAC MARKERS ( 25 Jul 2020 12:20 )  x     / <0.01 ng/mL / x     / x     / x            < from: Xray Chest 2 Views PA/Lat (07.25.20 @ 12:55) >    No radiographic evidence of acute cardiopulmonary disease.    < end of copied text >

## 2020-07-25 NOTE — H&P ADULT - NSHPSOCIALHISTORY_GEN_ALL_CORE
former smoker, denies alcohol or illicit drug use former smoker, smoke 4-5 cigerretess a day 40 years ago, drinks one beer daily but denies illicit drug use former smoker, smoke 4-5 cigarettes a day 40 years ago, drinks one beer daily but denies illicit drug use. 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  lives with wife

## 2020-07-25 NOTE — H&P ADULT - ASSESSMENT
69 year old male with past medical history of HTN, DLD, GERD, anxiety, cervical radiculopathy, chronic lower back pain presents with worsening chest pain since this morning. This morning patient woke up with left sided upper shoulder pain which later extended to substernal area, varies between sharp and dull in nature, 7/10 initially with nothing making it better or worse associated with shortness of breath that was unchanged with activity and some nausea with the pain. As per patient he has had similar chest for most days of last 3 years mostly early morning but this time worse than usual and decided to come to the hospital. Patient denies any cough, fever, chills, orthopnea dyspnea on exertion, abdominal pain but does endorse chronic feeling of numbness on the lateral aspect of left shoulder and anterior aspect of the left lower extremity.    Patient has had multiple ED visits and office visits with similar complaints and has had negative workup in the past and appears to have no evidence of any cardiac etiology for the chest pain except the CTA in 2018 which showed some stenosis of the LAD. Patient has been seeing Dr. Gloria who told him that he has a pinched nerve in the lower back and the cervical area and reports having some subjective numbness on the left arm and left leg not noted on exam. Will workup the pain with Cervical Spine MRI and consult Neurology. Patient also request cardiologist evaluation so will place consult. Will start the patient on Gabapentin and see how the pain responds to the treatment.    # Left sided Chest Pain likely non Cardiac in origin, possible cervical radiculopathy  # Moderate Stenosis of LAD on CTA 2018  # H/O of Cervical Radiculopathy  - Present for last 3 years or so with no significant cardiac etiology identified  - Pain nature, location and character more consistent with nerve impingement  - Troponin negative, EKG shows no new changes, not cardiac pain clinically  - Pain worse with neck flexion, negative Spurling and shoulder abduction  - Will order MRI Cervical Spine without contrast  - Will start Gabapentin 100 three times a day  - Admitted to tele, can D/C after 24h  - Consult Neurology Dr. Gloria  - Patient requesting cardio eval for the occlusion (Cardio consulted)    # HTN - not adequately controlled as per patient  - Continue Carvedilol 12.5 daily  - Continue Lisinopril 10 daily. Increase dose if remains elevated    # DLD  - Continue Rosuvastatin 20    # Anxiety  - Continue Buspirone 7.5 three times a day    # GERD  - Continue Pantoprazole    # BPH  - Continue Tamsulosin and Finasteride    GI: Pantoprazole  DVT: Lovenox  Activity: Ambulate as tolerated  Dispo: Pending neuro workup. 69 year old male with past medical history of HTN, DLD, GERD, anxiety, cervical radiculopathy, chronic lower back pain presents with worsening chest pain since this morning. This morning patient woke up with left sided upper shoulder pain which later extended to substernal area, varies between sharp and dull in nature, 7/10 initially with nothing making it better or worse associated with shortness of breath that was unchanged with activity and some nausea with the pain. As per patient he has had similar chest for most days of last 3 years mostly early morning but this time worse than usual and decided to come to the hospital. Patient denies any cough, fever, chills, orthopnea dyspnea on exertion, abdominal pain but does endorse chronic feeling of numbness on the lateral aspect of left shoulder and anterior aspect of the left lower extremity.    Patient has had multiple ED visits and office visits with similar complaints and has had negative workup in the past and appears to have no evidence of any cardiac etiology for the chest pain except the CTA in 2018 which showed some stenosis of the LAD. Patient has been seeing Dr. Gloria who told him that he has a pinched nerve in the lower back and the cervical area and reports having some subjective numbness on the left arm and left leg not noted on exam. Will workup the pain with Cervical Spine MRI and consult Neurology. Patient also request cardiologist evaluation so will place consult. Will start the patient on Gabapentin and see how the pain responds to the treatment.    # Left sided Chest Pain likely non Cardiac in origin, possible cervical radiculopathy  # Moderate Stenosis of LAD on CTA 2018  # H/O of Cervical Radiculopathy  - Present for last 3 years or so with no significant cardiac etiology identified  - Pain nature, location and character more consistent with nerve impingement  - Troponin negative, EKG shows no new changes, not cardiac pain clinically  - Pain worse with neck flexion, negative Spurling and shoulder abduction  - Will order MRI Cervical Spine without contrast  - Will start Gabapentin 100 three times a day  - Admitted to tele, can D/C after 24h  - Consult Neurology Dr. Gloria  - Patient requesting cardio eval for the occlusion (Cardio consulted)    # HTN - not adequately controlled as per patient  - Continue Carvedilol 12.5 daily  - Continue Lisinopril 10 daily  - Elevated in ED so gave an extra 10 of lisinopril. Can increase total dose if remains elevated    # DLD  - Continue Rosuvastatin 20    # Anxiety  - Continue Buspirone 7.5 three times a day    # GERD  - Continue Pantoprazole    # BPH  - Continue Tamsulosin and Finasteride    GI: Pantoprazole  DVT: Lovenox  Activity: Ambulate as tolerated  Dispo: Pending neuro workup. 69 year old male with past medical history of HTN, DLD, GERD, anxiety, cervical radiculopathy, chronic lower back pain presents with worsening chest pain since this morning. This morning patient woke up with left sided upper shoulder pain which later extended to substernal area, varies between sharp and dull in nature, 7/10 initially with nothing making it better or worse associated with shortness of breath that was unchanged with activity and some nausea with the pain. As per patient he has had similar chest for most days of last 3 years mostly early morning but this time worse than usual and decided to come to the hospital. Patient denies any cough, fever, chills, orthopnea dyspnea on exertion, abdominal pain but does endorse chronic feeling of numbness on the lateral aspect of left shoulder and anterior aspect of the left lower extremity.    Patient has had multiple ED visits and office visits with similar complaints and has had negative workup in the past and appears to have no evidence of any cardiac etiology for the chest pain except the CTA in 2018 which showed some stenosis of the LAD and was told by his new cardiologist that he might "cath" him so will consult cardio. Patient has been seeing Dr. Gloria who told him that he has a pinched nerve in the lower back and the cervical area and reports having some subjective numbness on the left arm and left leg not noted on exam. Will workup the pain with Cervical Spine MRI and consult Neurology. Patient also request cardiologist evaluation so will place consult. Will start the patient on Gabapentin and see how the pain responds to the treatment.    # Left sided Chest Pain likely non Cardiac in origin, possible cervical radiculopathy  # Moderate Stenosis of LAD on CTA 2018  # H/O of Cervical Radiculopathy  - Present for last 3 years or so with no significant cardiac etiology identified  - Pain nature, location and character more consistent with nerve impingement  - Troponin negative, EKG shows no new changes, not cardiac pain clinically  - Pain worse with neck flexion, negative Spurling and shoulder abduction  - Will order MRI Cervical Spine without contrast  - Will start Gabapentin 100 three times a day  - Admitted to tele, can D/C after 24h  - Consult Neurology Dr. Gloria  - Patient requesting cardio eval for the occlusion (Cardio consulted)    # HTN - not adequately controlled as per patient  - Continue Carvedilol 12.5 daily  - Continue Lisinopril 10 daily  - Elevated in ED so gave an extra 10 of lisinopril. Can increase total dose if remains elevated    # DLD  - Continue Statin    # Anxiety  - Continue Buspirone 7.5 three times a day    # GERD  - Continue Pantoprazole    # BPH  - Continue Tamsulosin and Finasteride    GI: Pantoprazole  DVT: Lovenox  Activity: Ambulate as tolerated  Dispo: Pending neuro workup. 69 year old male with past medical history of HTN, DLD, GERD, anxiety, cervical radiculopathy, chronic lower back pain presents with worsening chest pain since this morning. This morning patient woke up with left sided upper shoulder pain which later extended to substernal area, varies between sharp and dull in nature, 7/10 initially with nothing making it better or worse associated with shortness of breath that was unchanged with activity and some nausea with the pain. As per patient he has had similar chest for most days of last 3 years mostly early morning but this time worse than usual and decided to come to the hospital. Patient denies any cough, fever, chills, orthopnea dyspnea on exertion, abdominal pain but does endorse chronic feeling of numbness on the lateral aspect of left shoulder and anterior aspect of the left lower extremity.    Patient has had multiple ED visits and office visits with similar complaints and has had negative workup in the past and appears to have no evidence of any cardiac etiology for the chest pain except the CTA in 2018 which showed some stenosis of the LAD and was told by his new cardiologist that he might "cath" him so can follow cardio outpatient. Patient has been seeing Dr. Gloria who told him that he has a pinched nerve in the lower back and the cervical area and reports having some subjective numbness on the left arm and left leg not noted on exam. Will order Physical therapy and follow neuro outpatient. Will start the patient on Gabapentin and see how the pain responds to the treatment.    # Left sided Chest Pain non Cardiac in origin, likely cervical radiculopathy  # Moderate Stenosis of LAD on CTA 2018  # H/O of Cervical Radiculopathy  - Present for last 3 years or so with no significant cardiac etiology identified  - Pain nature, location and character more consistent with nerve impingement  - Troponin negative, EKG shows no new changes, not cardiac pain clinically  - Pain worse with neck flexion, negative Spurling and shoulder abduction  - No need for MRI as per attending  - Will start Gabapentin 100 three times a day  - No need for tele  - Outpatient Neurology Dr. Gloria follow up  - No need for cardio evaluation    # HTN - not adequately controlled as per patient  - Continue Carvedilol 12.5 daily  - Continue Lisinopril 10 daily  - Elevated in ED so gave an extra 10 of lisinopril. Can increase total dose if remains elevated    # DLD  - Continue Statin    # Anxiety  - Continue Buspirone 7.5 three times a day    # GERD  - Continue Pantoprazole    # BPH  - Continue Tamsulosin and Finasteride    GI: Pantoprazole  DVT: Lovenox  Activity: Ambulate as tolerated  Dispo: Pending neuro workup. 69 year old male with past medical history of HTN, DLD, GERD, anxiety, cervical radiculopathy, chronic lower back pain presents with worsening chest pain since this morning. This morning patient woke up with left sided upper shoulder pain which later extended to substernal area, varies between sharp and dull in nature, 7/10 initially with nothing making it better or worse associated with shortness of breath that was unchanged with activity and some nausea with the pain. As per patient he has had similar chest for most days of last 3 years mostly early morning but this time worse than usual and decided to come to the hospital. Patient denies any cough, fever, chills, orthopnea dyspnea on exertion, abdominal pain but does endorse chronic feeling of numbness on the lateral aspect of left shoulder and anterior aspect of the left lower extremity.    Patient has had multiple ED visits and office visits with similar complaints and has had negative workup in the past and appears to have no evidence of any cardiac etiology for the chest pain except the CTA in 2018 which showed some stenosis of the LAD and was told by his new cardiologist that he might "cath" him so can follow cardio outpatient. Patient has been seeing Dr. Gloria who told him that he has a pinched nerve in the lower back and the cervical area and reports having some subjective numbness on the left arm and left leg not noted on exam. Will order Physical therapy and follow neuro outpatient. Will start the patient on Gabapentin and see how the pain responds to the treatment.    # Left sided Chest Pain non Cardiac in origin, likely cervical radiculopathy  # Moderate Stenosis of LAD on CTA 2018  # H/O of Cervical Radiculopathy  - Present for last 3 years or so with no significant cardiac etiology identified  - Pain nature, location and character more consistent with nerve impingement  - Troponin negative, EKG shows no new changes, not cardiac pain clinically  - Pain worse with neck flexion, negative Spurling and shoulder abduction  - No need for MRI as per attending  - Will start Gabapentin 100 three times a day  - No need for tele  - Outpatient Neurology Dr. Gloria follow up  - No need for cardio evaluation    # HTN - not adequately controlled as per patient  - Continue Carvedilol 12.5 daily  - Continue Lisinopril 10 daily  - Elevated in ED so gave an extra 10 of lisinopril. Can increase total dose if remains elevated    # DLD  - Continue Statin    # Anxiety  - Continue Buspirone 7.5 three times a day    # GERD  - Continue Pantoprazole    # BPH  - Continue Tamsulosin and Finasteride    GI: Pantoprazole  DVT: Lovenox  Activity: Ambulate as tolerated  Dispo: Pending PT and pain improvement 69 year old male with past medical history of HTN, DLD, GERD, anxiety, cervical radiculopathy, chronic lower back pain presents with worsening left shoulder pain, radiating to the chest.     # Cervical radiculopathy  - pain reproducible with neck manipulation positive Spurling   - start Gabapentin 100 three times a day  - D/C tele   - No need for MRI as per attending  - Outpatient Neurology Dr. Gloria follow up    # CAD  - Troponin negative, EKG shows no new changes, not cardiac pain clinically  - Moderate Stenosis of LAD on CTA 2018  - start ASA   - c/w Carvedilol and atorvastatin   - outpt cardiology f/u for stress test    # HTN   - not adequately controlled as per patient  - Continue Carvedilol 12.5 daily  - Continue Lisinopril 10 daily    # DLD  - Continue Statin    # Anxiety  - Continue Buspirone 7.5 three times a day    # GERD  - Continue Pantoprazole    # BPH  - Continue Tamsulosin and Finasteride    GI: Pantoprazole  DVT: Lovenox  Activity: Ambulate as tolerated  Dispo: Pending PT and pain improvement    # Anticipate for discharge tomorrow 69 year old male with past medical history of HTN, DLD, GERD, anxiety, cervical radiculopathy, chronic lower back pain presents with worsening left shoulder pain, radiating to the chest.     # Cervical radiculopathy  - pain reproducible with neck manipulation positive Spurling   - start Gabapentin 100 three times a day  - PT  - D/C tele   - No need for MRI as per attending  - Outpatient Neurology Dr. Gloria follow up    # CAD  - Troponin negative, EKG shows no new changes, not cardiac pain clinically  - Moderate Stenosis of LAD on CTA 2018  - start ASA   - c/w Carvedilol and atorvastatin   - outpt cardiology f/u for stress test    # HTN   - not adequately controlled as per patient  - Continue Carvedilol 12.5 daily  - Continue Lisinopril 10 daily    # DLD  - Continue Statin    # Anxiety  - Continue Buspirone 7.5 three times a day    # GERD  - Continue Pantoprazole    # BPH  - Continue Tamsulosin and Finasteride    GI: Pantoprazole  DVT: Lovenox  Activity: Ambulate as tolerated    # Anticipate for discharge tomorrow

## 2020-07-25 NOTE — ED PROVIDER NOTE - ATTENDING CONTRIBUTION TO CARE
I personally evaluated the patient. I reviewed the Resident’s or Physician Assistant’s note (as assigned above), and agree with the findings and plan except as documented in my note.      69 year old male with a pmh BPH HTN HLD CAD (last CCTA was november 2018 and was found to have a cads-rads 3). Patient states everyday he wakes up c/o chest pain which typically resolved but today sympotms lasted which prompted him to come to the ED. Patient was given nitro & aspirin by EMS and reported improvement in symptoms after nitro. Chest pain is left sided to midsternal 8/10. Nothing makes the pain worse. Symptoms associated with SOB which has been chronic. denies any fever chills cough n/v abdominal pain or urinary complaints. Cardiologist Edwin Mcnally    On exam.  CONSTITUTIONAL: WA / WN / NAD  HEAD: NCAT  EYES: PERRL; EOMI;   ENT: Normal pharynx; mucous membranes pink/moist, no erythema.  NECK: Supple; no meningeal signs  CARD: RRR; nl S1/S2; no M/R/G.   RESP: Respiratory rate and effort are normal; breath sounds clear and equal bilaterally.  ABD: Soft, NT ND nl bowel sounds; no masses; no rebound  MSK/EXT: No gross deformities; full range of motion.  SKIN: Warm and dry;   NEURO: AAOx3,  PSYCH: Memory Intact, Normal Affect

## 2020-07-25 NOTE — ED PROVIDER NOTE - CLINICAL SUMMARY MEDICAL DECISION MAKING FREE TEXT BOX
69 year old male with a pmh BPH HTN HLD CAD (last CCTA was november 2018 and was found to have a cads-rads 3) presented c/o chest pain. Chest pain left sided non radiating. labs ekg imaging obtained. Given high risk of ACS admitted to telemetry.

## 2020-07-25 NOTE — ED ADULT TRIAGE NOTE - CHIEF COMPLAINT QUOTE
as per ems pt having left sided chest pain radiating to his shoulder. ems gave 2 nitro and 2 asa about 20 mins ago with some relief

## 2020-07-25 NOTE — ED ADULT NURSE NOTE - OBJECTIVE STATEMENT
pt presents to ED c/o left sided chest pain x 1 year, associated with SOB. Pt describes pain as sharp, intermittent and heavy.

## 2020-07-25 NOTE — ED ADULT NURSE REASSESSMENT NOTE - NS ED NURSE REASSESS COMMENT FT1
Patient received from offgoing RN. VS stable. Patient resting comfortably in no acute distress, with no complaints at this time. Patient awaiting bed on Medicine unit. Will continue to monitor.

## 2020-07-25 NOTE — H&P ADULT - NSHPREVIEWOFSYSTEMS_GEN_ALL_CORE
CONSTITUTIONAL: No weakness, fevers or chills, no weight loss   EYES/ENT: No visual changes;  No vertigo or throat pain   NECK: No pain or stiffness  RESPIRATORY: No cough, wheezing, hemoptysis; No shortness of breath  CARDIOVASCULAR: No chest pain or palpitations  GASTROINTESTINAL: No abdominal or epigastric pain. No nausea, vomiting, or hematemesis; No diarrhea or constipation. No melena or hematochezia.  GENITOURINARY: No dysuria, frequency or hematuria  NEUROLOGICAL: No numbness or weakness  All other review of systems is negative unless indicated above. CONSTITUTIONAL: No weakness, fevers or chills, no weight loss   EYES/ENT: No visual changes;  No vertigo or throat pain   NECK: No pain or stiffness  RESPIRATORY: No cough, wheezing, hemoptysis; No shortness of breath  CARDIOVASCULAR: Left sided chest pain  GASTROINTESTINAL: No abdominal or epigastric pain. No nausea, vomiting, or hematemesis; No diarrhea or constipation. No melena or hematochezia.  GENITOURINARY: No dysuria, frequency or hematuria  NEUROLOGICAL: Numbness on the left upper arm and left front leg  All other review of systems is negative unless indicated above.

## 2020-07-25 NOTE — ED ADULT NURSE NOTE - NSIMPLEMENTINTERV_GEN_ALL_ED
Implemented All Universal Safety Interventions:  [place] to call system. Call bell, personal items and telephone within reach. Instruct patient to call for assistance. Room bathroom lighting operational. Non-slip footwear when patient is off stretcher. Physically safe environment: no spills, clutter or unnecessary equipment. Stretcher in lowest position, wheels locked, appropriate side rails in place.

## 2020-07-25 NOTE — H&P ADULT - HISTORY OF PRESENT ILLNESS
69 year old male with past medical history of HTN, DLD, GERD, anxiety presents with chest pain.    Pain is left sided, substernal, radiating to left shoulder. CCTA in 2018 showed moderate stenosis of LAD. Patient denies any cough, fever, chills, orthopnea dyspnea on exertion, abdominal pain, numbness and tingling    Patient received 2 doses of Nitroglycerin and Aspirin 162mg by EMS and was brought to ED. In ED EKG showed no new changes and troponin were negative.       pain mostly in left shoulder, occasionally with numbness in left arm and left leg, again for years, numbness attributed to pinched nerves in neck and back, however c/o worsening pain with occasional SOB, had stress test which was apparently abnormal and given rx for CCTA as outpatient, 3 hours ago felt the pain again, 7/10, asked his wife to bring him to hospital,  no sob, no orthopnea, no change in ET, no cough, no feer, no melena 69 year old male with past medical history of HTN, DLD, GERD, anxiety, cervical radiculopathy, chronic lower back pain presents with worsening chest pain since this morning.    This morning patient woke up with left sided upper shoulder pain which later extended to substernal area, varies between sharp and dull in nature, 7/10 initially with nothing making it better or worse associated with shortness of breath that was unchanged with activity and some nausea with the pain. As per patient he has had similar chest for most days of last 3 years mostly early morning but this time worse than usual and decided to come to the hospital. Patient denies any cough, fever, chills, orthopnea dyspnea on exertion, abdominal pain but does endorse chronic feeling of numbness on the lateral aspect of left shoulder and anterior aspect of the left lower extremity.    Patient received 2 doses of Nitroglycerin and Aspirin 162mg by EMS with subjective improvement in pain and was brought to ED. In ED EKG showed no new changes and troponin were negative. Patient had a Coronary CTA in 2018 showed moderate stenosis of LAD but otherwise has followed with multiple cardiologist without any objective evidence of cardiac disease. Very similar admission in the past with no cardiac positive finding.    On Interview patient endorses that his pain post treatment has been 4-5/10 down from 7/10 but still persists. Otherwise denies any other active complaints.

## 2020-07-25 NOTE — H&P ADULT - NSHPPHYSICALEXAM_GEN_ALL_CORE
PHYSICAL EXAM:  GENERAL: NAD, lying in bed comfortably  HEAD:  Atraumatic, Normocephalic  EYES: EOMI, PERRLA, conjunctiva and sclera clear  ENT: Moist mucous membranes  NECK: Supple, No JVD  CHEST/LUNG: Clear to auscultation bilaterally; No rales, rhonchi, wheezing, or rubs. Unlabored respirations  HEART: Regular rate and rhythm; No murmurs, rubs, or gallops  ABDOMEN: Bowel sounds present; Soft, Nontender, Nondistended. No hepatomegaly  EXTREMITIES:  2+ Peripheral Pulses, brisk capillary refill. No clubbing, cyanosis, or edema  NERVOUS SYSTEM:  Alert & Oriented X3, speech clear. No deficits   MSK: FROM all 4 extremities, full and equal strength  SKIN: No rashes or lesions GENERAL: NAD, lying in bed comfortably  HEAD:  Atraumatic, Normocephalic  EYES: EOMI, PERRLA, conjunctiva and sclera clear  NECK: Supple, No JVD  CHEST/LUNG: Clear to auscultation bilaterally; No rales, rhonchi, wheezing, or rubs. Unlabored respirations, Tenderness to palpation of the left SCM area and left sided chest wall, increased pain with movement of the head forwards in the chest and shoulder area  HEART: Regular rate and rhythm; No murmurs, rubs, or gallops  ABDOMEN: Bowel sounds present; Soft, Nontender, Nondistended. No hepatomegaly  EXTREMITIES:  2+ Peripheral Pulses, brisk capillary refill. No clubbing, cyanosis, or edema  NERVOUS SYSTEM:  Alert & Oriented X3, speech clear. No senosry or motor loss observed on exam  MSK: FROM all 4 extremities, full and equal strength  SKIN: No rashes or lesions GENERAL: NAD, lying in bed comfortably  HEAD:  Atraumatic, Normocephalic  EYES: EOMI, PERRLA, conjunctiva and sclera clear  NECK: Supple, No JVD; (+) Spurling test  CHEST/LUNG: Clear to auscultation bilaterally; No rales, rhonchi, wheezing, or rubs. Unlabored respirations, Tenderness to palpation of the left SCM area and left sided chest wall, increased pain with movement of the head forwards in the chest and shoulder area  HEART: Regular rate and rhythm; No murmurs, rubs, or gallops  ABDOMEN: Bowel sounds present; Soft, Nontender, Nondistended. No hepatomegaly  EXTREMITIES:  2+ Peripheral Pulses, brisk capillary refill. No clubbing, cyanosis, or edema  NERVOUS SYSTEM:  Alert & Oriented X3, speech clear. No sensory or motor loss observed on exam  MSK: FROM all 4 extremities, full and equal strength  SKIN: No rashes or lesions

## 2020-07-26 PROBLEM — F32.9 MAJOR DEPRESSIVE DISORDER, SINGLE EPISODE, UNSPECIFIED: Chronic | Status: ACTIVE | Noted: 2018-11-04

## 2020-07-26 PROBLEM — N40.0 BENIGN PROSTATIC HYPERPLASIA WITHOUT LOWER URINARY TRACT SYMPTOMS: Chronic | Status: ACTIVE | Noted: 2018-11-04

## 2020-07-26 PROBLEM — I10 ESSENTIAL (PRIMARY) HYPERTENSION: Chronic | Status: ACTIVE | Noted: 2018-11-04

## 2020-07-26 LAB
ALBUMIN SERPL ELPH-MCNC: 4.4 G/DL — SIGNIFICANT CHANGE UP (ref 3.5–5.2)
ALP SERPL-CCNC: 65 U/L — SIGNIFICANT CHANGE UP (ref 30–115)
ALT FLD-CCNC: 51 U/L — HIGH (ref 0–41)
ANION GAP SERPL CALC-SCNC: 11 MMOL/L — SIGNIFICANT CHANGE UP (ref 7–14)
AST SERPL-CCNC: 30 U/L — SIGNIFICANT CHANGE UP (ref 0–41)
BASOPHILS # BLD AUTO: 0.06 K/UL — SIGNIFICANT CHANGE UP (ref 0–0.2)
BASOPHILS NFR BLD AUTO: 1.1 % — HIGH (ref 0–1)
BILIRUB SERPL-MCNC: 0.6 MG/DL — SIGNIFICANT CHANGE UP (ref 0.2–1.2)
BUN SERPL-MCNC: 15 MG/DL — SIGNIFICANT CHANGE UP (ref 10–20)
CALCIUM SERPL-MCNC: 9.5 MG/DL — SIGNIFICANT CHANGE UP (ref 8.5–10.1)
CHLORIDE SERPL-SCNC: 99 MMOL/L — SIGNIFICANT CHANGE UP (ref 98–110)
CHOLEST SERPL-MCNC: 123 MG/DL — SIGNIFICANT CHANGE UP (ref 100–200)
CO2 SERPL-SCNC: 27 MMOL/L — SIGNIFICANT CHANGE UP (ref 17–32)
CREAT SERPL-MCNC: 1.1 MG/DL — SIGNIFICANT CHANGE UP (ref 0.7–1.5)
EOSINOPHIL # BLD AUTO: 0.2 K/UL — SIGNIFICANT CHANGE UP (ref 0–0.7)
EOSINOPHIL NFR BLD AUTO: 3.7 % — SIGNIFICANT CHANGE UP (ref 0–8)
GLUCOSE SERPL-MCNC: 170 MG/DL — HIGH (ref 70–99)
HCT VFR BLD CALC: 43.1 % — SIGNIFICANT CHANGE UP (ref 42–52)
HDLC SERPL-MCNC: 39 MG/DL — LOW
HGB BLD-MCNC: 13.3 G/DL — LOW (ref 14–18)
IMM GRANULOCYTES NFR BLD AUTO: 0.2 % — SIGNIFICANT CHANGE UP (ref 0.1–0.3)
LIPID PNL WITH DIRECT LDL SERPL: 63 MG/DL — SIGNIFICANT CHANGE UP (ref 4–129)
LYMPHOCYTES # BLD AUTO: 1.53 K/UL — SIGNIFICANT CHANGE UP (ref 1.2–3.4)
LYMPHOCYTES # BLD AUTO: 28.4 % — SIGNIFICANT CHANGE UP (ref 20.5–51.1)
MAGNESIUM SERPL-MCNC: 2.3 MG/DL — SIGNIFICANT CHANGE UP (ref 1.8–2.4)
MCHC RBC-ENTMCNC: 22.7 PG — LOW (ref 27–31)
MCHC RBC-ENTMCNC: 30.9 G/DL — LOW (ref 32–37)
MCV RBC AUTO: 73.4 FL — LOW (ref 80–94)
MONOCYTES # BLD AUTO: 0.47 K/UL — SIGNIFICANT CHANGE UP (ref 0.1–0.6)
MONOCYTES NFR BLD AUTO: 8.7 % — SIGNIFICANT CHANGE UP (ref 1.7–9.3)
NEUTROPHILS # BLD AUTO: 3.11 K/UL — SIGNIFICANT CHANGE UP (ref 1.4–6.5)
NEUTROPHILS NFR BLD AUTO: 57.9 % — SIGNIFICANT CHANGE UP (ref 42.2–75.2)
NRBC # BLD: 0 /100 WBCS — SIGNIFICANT CHANGE UP (ref 0–0)
PLATELET # BLD AUTO: 197 K/UL — SIGNIFICANT CHANGE UP (ref 130–400)
POTASSIUM SERPL-MCNC: 4.4 MMOL/L — SIGNIFICANT CHANGE UP (ref 3.5–5)
POTASSIUM SERPL-SCNC: 4.4 MMOL/L — SIGNIFICANT CHANGE UP (ref 3.5–5)
PROT SERPL-MCNC: 7.4 G/DL — SIGNIFICANT CHANGE UP (ref 6–8)
RBC # BLD: 5.87 M/UL — SIGNIFICANT CHANGE UP (ref 4.7–6.1)
RBC # FLD: 15.3 % — HIGH (ref 11.5–14.5)
SARS-COV-2 RNA SPEC QL NAA+PROBE: SIGNIFICANT CHANGE UP
SODIUM SERPL-SCNC: 137 MMOL/L — SIGNIFICANT CHANGE UP (ref 135–146)
TOTAL CHOLESTEROL/HDL RATIO MEASUREMENT: 3.2 RATIO — LOW (ref 4–5.5)
TRIGL SERPL-MCNC: 156 MG/DL — HIGH (ref 10–149)
TROPONIN T SERPL-MCNC: <0.01 NG/ML — SIGNIFICANT CHANGE UP
WBC # BLD: 5.38 K/UL — SIGNIFICANT CHANGE UP (ref 4.8–10.8)
WBC # FLD AUTO: 5.38 K/UL — SIGNIFICANT CHANGE UP (ref 4.8–10.8)

## 2020-07-26 PROCEDURE — 99233 SBSQ HOSP IP/OBS HIGH 50: CPT

## 2020-07-26 PROCEDURE — 72141 MRI NECK SPINE W/O DYE: CPT | Mod: 26

## 2020-07-26 PROCEDURE — 93010 ELECTROCARDIOGRAM REPORT: CPT

## 2020-07-26 RX ORDER — LISINOPRIL 2.5 MG/1
20 TABLET ORAL DAILY
Refills: 0 | Status: DISCONTINUED | OUTPATIENT
Start: 2020-07-26 | End: 2020-07-27

## 2020-07-26 RX ADMIN — Medication 7.5 MILLIGRAM(S): at 13:52

## 2020-07-26 RX ADMIN — GABAPENTIN 100 MILLIGRAM(S): 400 CAPSULE ORAL at 06:57

## 2020-07-26 RX ADMIN — GABAPENTIN 100 MILLIGRAM(S): 400 CAPSULE ORAL at 13:52

## 2020-07-26 RX ADMIN — LISINOPRIL 10 MILLIGRAM(S): 2.5 TABLET ORAL at 05:51

## 2020-07-26 RX ADMIN — Medication 7.5 MILLIGRAM(S): at 22:25

## 2020-07-26 RX ADMIN — ATORVASTATIN CALCIUM 20 MILLIGRAM(S): 80 TABLET, FILM COATED ORAL at 22:26

## 2020-07-26 RX ADMIN — FINASTERIDE 5 MILLIGRAM(S): 5 TABLET, FILM COATED ORAL at 11:21

## 2020-07-26 RX ADMIN — LISINOPRIL 20 MILLIGRAM(S): 2.5 TABLET ORAL at 14:43

## 2020-07-26 RX ADMIN — CARVEDILOL PHOSPHATE 12.5 MILLIGRAM(S): 80 CAPSULE, EXTENDED RELEASE ORAL at 17:27

## 2020-07-26 RX ADMIN — TAMSULOSIN HYDROCHLORIDE 0.4 MILLIGRAM(S): 0.4 CAPSULE ORAL at 22:26

## 2020-07-26 RX ADMIN — Medication 81 MILLIGRAM(S): at 11:21

## 2020-07-26 RX ADMIN — Medication 7.5 MILLIGRAM(S): at 05:51

## 2020-07-26 RX ADMIN — Medication 1 DROP(S): at 11:22

## 2020-07-26 RX ADMIN — ENOXAPARIN SODIUM 40 MILLIGRAM(S): 100 INJECTION SUBCUTANEOUS at 22:26

## 2020-07-26 RX ADMIN — GABAPENTIN 100 MILLIGRAM(S): 400 CAPSULE ORAL at 22:26

## 2020-07-26 RX ADMIN — CARVEDILOL PHOSPHATE 12.5 MILLIGRAM(S): 80 CAPSULE, EXTENDED RELEASE ORAL at 05:51

## 2020-07-26 NOTE — CONSULT NOTE ADULT - ASSESSMENT
69 year old male with past medical history of CAD (dx via CCTA 2018:  moderate stenosis LAD), HTN, DLD, GERD, anxiety, cervical radiculopathy, chronic lower back pain, chronic L radiating shoulder/L upper chest pains a/w radiation to retrosternal chest / numbness and tingling of L arm / occasional sob / occasional diaphoresis over past year --  pw episode of worse than usual L shoulder/retrosternal chest pains on day of presentation, that improved with nitro given in hospital.       ATYPICAL CHEST PAIN (with some typical features)  - no current symptoms   - ekg: nsr, no st changes.   - trops: neg x 1  - per pt,  cardiologist dr lee had recommended CCTA (already planned outpt) + possible cardiac cath (pt hesitant / anxious about possibility of procedure and wishes for non-invasive strategies first)  - repeat CCTA    >> will d/w attending 69 year old male with past medical history of CAD (dx via CCTA 2018:  moderate stenosis LAD), HTN, DLD, GERD, anxiety, cervical radiculopathy, chronic lower back pain, chronic L radiating shoulder/L upper chest pains a/w radiation to retrosternal chest / numbness and tingling of L arm / occasional sob / occasional diaphoresis over past year --  pw episode of worse than usual L shoulder/retrosternal chest pains on day of presentation, that improved with nitro given in hospital.       ATYPICAL CHEST PAIN (with some typical features)  - no current symptoms   - ekg: nsr, no st changes.   - trops: neg x 1  - per pt,  cardiologist dr lee had recommended CCTA (already planned outpt) + possible cardiac cath (pt hesitant / anxious about possibility of procedure and wishes for non-invasive strategies first)  - recommending EST    discussed with attending 69 year old male with past medical history of CAD (dx via CCTA 2018:  moderate stenosis LAD), HTN, DLD, GERD, anxiety, cervical radiculopathy, chronic lower back pain, chronic L radiating shoulder/L upper chest pains a/w radiation to retrosternal chest / numbness and tingling of L arm / occasional sob / occasional diaphoresis over past year --  pw episode of worse than usual L shoulder/retrosternal chest pains on day of presentation, that improved with nitro given in hospital.       ATYPICAL CHEST PAIN (with some typical features)  - no current symptoms   - ekg: nsr, no st changes.   - trops: neg x 1  - per pt,  cardiologist dr lee had recommended CCTA (already planned outpt) + possible cardiac cath (pt hesitant / anxious about possibility of procedure and wishes for non-invasive strategies first)  - recommending EST:  exercise mpi    discussed with attending 69 year old male with past medical history of CAD (dx via CCTA 2018:  moderate stenosis LAD), HTN, DLD, GERD, anxiety, cervical radiculopathy, chronic lower back pain, chronic L radiating shoulder/L upper chest pains a/w radiation to retrosternal chest / numbness and tingling of L arm / occasional sob / occasional diaphoresis over past year --  pw episode of worse than usual L shoulder/retrosternal chest pains on day of presentation, that improved with nitro given in hospital.       ATYPICAL CHEST PAIN (with some typical features)  - no current symptoms   - ekg: nsr, no st changes.   - trops: neg x 1  - per pt,  cardiologist dr lee had recommended CCTA (already planned outpt) + possible cardiac cath (pt hesitant / anxious about possibility of procedure and wishes for non-invasive strategies first)  - recommending EST:  exercise mpi after two sets are (-)    discussed with attending

## 2020-07-26 NOTE — PHYSICAL THERAPY INITIAL EVALUATION ADULT - GENERAL OBSERVATIONS, REHAB EVAL
08:30-09:05 Pt encountered semifowler in bed with IV locks in NAD. Pt left seated EOB with IV locks in NAD.

## 2020-07-26 NOTE — CONSULT NOTE ADULT - SUBJECTIVE AND OBJECTIVE BOX
HPI:  69 year old male with past medical history of CAD (dx via CCTA 2018:  moderate stenosis LAD), HTN, DLD, GERD, anxiety, cervical radiculopathy, chronic lower back pain pw chest pain.      Pt states night before presentation,  he was cooking when he started to feel some retrosternal pain and sob.  Next am,  pt woke with L shoulder pains radiating to chest a/w nausea / anxiety / diphoresis / L arm and L leg numbness/tingling.   cp improved with nitro given in hospital and has not recurred while here.      Of note, pt states over past year,  he's been having similar symptoms nearly daily,  sometimes with exertion and sometimes with rest.  States the L shoulder pain radiating to chest (often in upper/lateral chest near shoulders, but when very painful can radiate to retrosternal region as well) with numbness / tingling over L leg is the usual presentation.   Pt also notes sometimes the triad of symptoms is worse after exercises involving movement of the upper extremities, like push ups / jumping jacks.      He also adds that he would also be able to walk around his house/block at times without any symptoms and feel good afterward.      Pt states he called his cardiologist Dr. Oseguera 4d ago,  was told he should get repeat CCTA / cardiac catheterization,  but pt is hesitant regarding cardiac catheterization 2/2 anxiety about the procedure.          PAST MEDICAL & SURGICAL HISTORY  High cholesterol  Depression  Hypertension  BPH (benign prostatic hyperplasia)      CARDIAC RISK FACTORS:  [x ]CAD  [ ]DM  [x ]DL  [ ]PVD  [ ]CVA  [x ]HTN  [ ]CHF     FAMILY HISTORY:  FAMILY HISTORY:  No pertinent family history in first degree relatives      SOCIAL HISTORY:  [x]smoker - former  []Alcohol  []Drug    ALLERGIES:  No Known Allergies      MEDICATIONS:  MEDICATIONS  (STANDING):  aspirin  chewable 81 milliGRAM(s) Oral daily  atorvastatin 20 milliGRAM(s) Oral at bedtime  busPIRone 7.5 milliGRAM(s) Oral three times a day  carvedilol 12.5 milliGRAM(s) Oral every 12 hours  chlorhexidine 4% Liquid 1 Application(s) Topical daily  enoxaparin Injectable 40 milliGRAM(s) SubCutaneous at bedtime  finasteride 5 milliGRAM(s) Oral daily  gabapentin 100 milliGRAM(s) Oral three times a day  lisinopril 20 milliGRAM(s) Oral daily  pantoprazole    Tablet 40 milliGRAM(s) Oral before breakfast  tamsulosin 0.4 milliGRAM(s) Oral at bedtime    MEDICATIONS  (PRN):  acetaminophen   Tablet .. 650 milliGRAM(s) Oral every 6 hours PRN Moderate Pain (4 - 6)      HOME MEDICATIONS:  Home Medications:  busPIRone 7.5 mg oral tablet: 1 tab(s) orally 3 times a day (25 Jul 2020 15:42)  carvedilol 12.5 mg oral tablet:  (25 Jul 2020 11:58)  finasteride 5 mg oral tablet: 1 tab(s) orally once a day (25 Jul 2020 11:57)  lisinopril 10 mg oral tablet: 1 tab(s) orally once a day (25 Jul 2020 11:58)  omeprazole 40 mg oral delayed release capsule: 1 cap(s) orally once a day (25 Jul 2020 11:58)  rosuvastatin 20 mg oral tablet: 1 tab(s) orally once a day (25 Jul 2020 11:58)  tamsulosin 0.4 mg oral capsule: 1 cap(s) orally once a day (25 Jul 2020 11:57)      VITALS:   T(F): 98.6 (07-26 @ 13:55), Max: 98.8 (07-26 @ 05:03)  HR: 73 (07-26 @ 13:55) (63 - 75)  BP: 156/92 (07-26 @ 13:55) (129/70 - 172/92)  BP(mean): --  RR: 19 (07-26 @ 13:55) (18 - 19)  SpO2: 98% (07-26 @ 07:57) (96% - 100%)    I&O's Summary      REVIEW OF SYSTEMS:  CONSTITUTIONAL: No weakness, fevers or chills  HEENT: No visual changes, neck/ear pain  RESPIRATORY: No cough, sob  CARDIOVASCULAR: No chest pain or palpitations  GASTROINTESTINAL: No abdominal pain. No nausea, vomiting, diarrhea   GENITOURINARY: No dysuria, frequency or hematuria  NEUROLOGICAL: No new focal deficits  SKIN: No new rashes    PHYSICAL EXAM:  General: Not in distress.  Non-toxic appearing.   HEENT: EOMI  Cardio: Regular rate and rhythm, S1, S2, no murmur;  pain not reproducible.   Pulm: B/L BS.  No wheezing / crackles  Abdomen: Soft, non-tender, non-distended. Normoactive bowel sounds  Extremities: No edema b/l   Neuro: A&O x3. No focal deficits    LABS:                        13.3   5.38  )-----------( 197      ( 26 Jul 2020 08:36 )             43.1     07-26    137  |  99  |  15  ----------------------------<  170<H>  4.4   |  27  |  1.1    Ca    9.5      26 Jul 2020 08:36  Mg     2.3     07-26    TPro  7.4  /  Alb  4.4  /  TBili  0.6  /  DBili  x   /  AST  30  /  ALT  51<H>  /  AlkPhos  65  07-26        CARDIAC MARKERS ( 25 Jul 2020 12:20 )  x     / <0.01 ng/mL / x     / x     / x            Troponin trend:    Serum Pro-Brain Natriuretic Peptide: 65 pg/mL (07-25-20 @ 12:20)    07-26 Chol 123 LDL 63 HDL 39<L> Trig 156<H>      RADIOLOGY:  -CXR: < from: Xray Chest 2 Views PA/Lat (07.25.20 @ 12:55) >  No radiographic evidence of acute cardiopulmonary disease.    < end of copied text >    -TTE:    -CCTA: < from: CT Heart with Coronaries (11.05.18 @ 13:33) >  1.  Moderate stenosis of proximal LAD, related to mixed plaque.  2.  Total coronary calcium score is 137.  For a 68 years old male , this   corresponds to 51 PAZ percentile rank.     CAD RAD: 3    < end of copied text >    -STRESS TEST:  -CATHETERIZATION:    ECG:      TELEMETRY EVENTS:

## 2020-07-26 NOTE — PHYSICAL THERAPY INITIAL EVALUATION ADULT - PERTINENT HX OF CURRENT PROBLEM, REHAB EVAL
PMH HTN, HLD presents to ED with c/o chest pain. Pt states chest pain has been ongoing for over (1) year, seen by cardiology multiple times. Pain is L sided, substernal, radiating to L shoulder, with no alleviating or aggravating factors.

## 2020-07-26 NOTE — PHYSICAL THERAPY INITIAL EVALUATION ADULT - PATIENT/FAMILY/SIGNIFICANT OTHER GOALS STATEMENT, PT EVAL
Pt lives in pvt home with wife and 2 kids. Pt has 1 JASMINE and 15 stairs inside home with RHR going up.

## 2020-07-27 ENCOUNTER — TRANSCRIPTION ENCOUNTER (OUTPATIENT)
Age: 70
End: 2020-07-27

## 2020-07-27 VITALS
OXYGEN SATURATION: 98 % | SYSTOLIC BLOOD PRESSURE: 157 MMHG | HEART RATE: 78 BPM | RESPIRATION RATE: 18 BRPM | DIASTOLIC BLOOD PRESSURE: 86 MMHG

## 2020-07-27 DIAGNOSIS — M48.02 SPINAL STENOSIS, CERVICAL REGION: ICD-10-CM

## 2020-07-27 LAB
A1C WITH ESTIMATED AVERAGE GLUCOSE RESULT: 6.6 % — HIGH (ref 4–5.6)
ANION GAP SERPL CALC-SCNC: 17 MMOL/L — HIGH (ref 7–14)
BUN SERPL-MCNC: 13 MG/DL — SIGNIFICANT CHANGE UP (ref 10–20)
CALCIUM SERPL-MCNC: 10.2 MG/DL — HIGH (ref 8.5–10.1)
CHLORIDE SERPL-SCNC: 98 MMOL/L — SIGNIFICANT CHANGE UP (ref 98–110)
CO2 SERPL-SCNC: 26 MMOL/L — SIGNIFICANT CHANGE UP (ref 17–32)
CREAT SERPL-MCNC: 1.1 MG/DL — SIGNIFICANT CHANGE UP (ref 0.7–1.5)
ESTIMATED AVERAGE GLUCOSE: 143 MG/DL — HIGH (ref 68–114)
GLUCOSE SERPL-MCNC: 112 MG/DL — HIGH (ref 70–99)
HCT VFR BLD CALC: 46.1 % — SIGNIFICANT CHANGE UP (ref 42–52)
HCV AB S/CO SERPL IA: 0.04 COI — SIGNIFICANT CHANGE UP
HCV AB SERPL-IMP: SIGNIFICANT CHANGE UP
HGB BLD-MCNC: 14.1 G/DL — SIGNIFICANT CHANGE UP (ref 14–18)
MCHC RBC-ENTMCNC: 22.7 PG — LOW (ref 27–31)
MCHC RBC-ENTMCNC: 30.6 G/DL — LOW (ref 32–37)
MCV RBC AUTO: 74.1 FL — LOW (ref 80–94)
NRBC # BLD: 0 /100 WBCS — SIGNIFICANT CHANGE UP (ref 0–0)
PLATELET # BLD AUTO: 206 K/UL — SIGNIFICANT CHANGE UP (ref 130–400)
POTASSIUM SERPL-MCNC: 3.9 MMOL/L — SIGNIFICANT CHANGE UP (ref 3.5–5)
POTASSIUM SERPL-SCNC: 3.9 MMOL/L — SIGNIFICANT CHANGE UP (ref 3.5–5)
RBC # BLD: 6.22 M/UL — HIGH (ref 4.7–6.1)
RBC # FLD: 16.2 % — HIGH (ref 11.5–14.5)
SODIUM SERPL-SCNC: 141 MMOL/L — SIGNIFICANT CHANGE UP (ref 135–146)
TROPONIN T SERPL-MCNC: <0.01 NG/ML — SIGNIFICANT CHANGE UP
WBC # BLD: 5.94 K/UL — SIGNIFICANT CHANGE UP (ref 4.8–10.8)
WBC # FLD AUTO: 5.94 K/UL — SIGNIFICANT CHANGE UP (ref 4.8–10.8)

## 2020-07-27 PROCEDURE — 99239 HOSP IP/OBS DSCHRG MGMT >30: CPT

## 2020-07-27 PROCEDURE — 99222 1ST HOSP IP/OBS MODERATE 55: CPT

## 2020-07-27 PROCEDURE — 78452 HT MUSCLE IMAGE SPECT MULT: CPT | Mod: 26

## 2020-07-27 PROCEDURE — 93016 CV STRESS TEST SUPVJ ONLY: CPT

## 2020-07-27 PROCEDURE — 93010 ELECTROCARDIOGRAM REPORT: CPT

## 2020-07-27 PROCEDURE — 93018 CV STRESS TEST I&R ONLY: CPT

## 2020-07-27 RX ORDER — GABAPENTIN 400 MG/1
1 CAPSULE ORAL
Qty: 90 | Refills: 0
Start: 2020-07-27 | End: 2020-08-25

## 2020-07-27 RX ORDER — LISINOPRIL 2.5 MG/1
1 TABLET ORAL
Qty: 0 | Refills: 0 | DISCHARGE

## 2020-07-27 RX ORDER — ISOSORBIDE MONONITRATE 60 MG/1
1 TABLET, EXTENDED RELEASE ORAL
Qty: 30 | Refills: 1
Start: 2020-07-27 | End: 2020-09-24

## 2020-07-27 RX ORDER — NITROGLYCERIN 6.5 MG
0.4 CAPSULE, EXTENDED RELEASE ORAL ONCE
Refills: 0 | Status: COMPLETED | OUTPATIENT
Start: 2020-07-27 | End: 2020-07-27

## 2020-07-27 RX ORDER — LISINOPRIL 2.5 MG/1
1 TABLET ORAL
Qty: 30 | Refills: 3
Start: 2020-07-27 | End: 2020-11-23

## 2020-07-27 RX ORDER — ASPIRIN/CALCIUM CARB/MAGNESIUM 324 MG
1 TABLET ORAL
Qty: 30 | Refills: 3
Start: 2020-07-27 | End: 2020-11-23

## 2020-07-27 RX ORDER — ASPIRIN/CALCIUM CARB/MAGNESIUM 324 MG
1 TABLET ORAL
Qty: 0 | Refills: 3 | DISCHARGE
Start: 2020-07-27 | End: 2020-11-23

## 2020-07-27 RX ADMIN — GABAPENTIN 100 MILLIGRAM(S): 400 CAPSULE ORAL at 05:40

## 2020-07-27 RX ADMIN — CARVEDILOL PHOSPHATE 12.5 MILLIGRAM(S): 80 CAPSULE, EXTENDED RELEASE ORAL at 17:17

## 2020-07-27 RX ADMIN — FINASTERIDE 5 MILLIGRAM(S): 5 TABLET, FILM COATED ORAL at 11:20

## 2020-07-27 RX ADMIN — Medication 81 MILLIGRAM(S): at 11:20

## 2020-07-27 RX ADMIN — CARVEDILOL PHOSPHATE 12.5 MILLIGRAM(S): 80 CAPSULE, EXTENDED RELEASE ORAL at 05:40

## 2020-07-27 RX ADMIN — PANTOPRAZOLE SODIUM 40 MILLIGRAM(S): 20 TABLET, DELAYED RELEASE ORAL at 05:39

## 2020-07-27 RX ADMIN — Medication 0.4 MILLIGRAM(S): at 13:17

## 2020-07-27 RX ADMIN — Medication 30 MILLILITER(S): at 17:17

## 2020-07-27 RX ADMIN — LISINOPRIL 20 MILLIGRAM(S): 2.5 TABLET ORAL at 05:40

## 2020-07-27 RX ADMIN — GABAPENTIN 100 MILLIGRAM(S): 400 CAPSULE ORAL at 13:18

## 2020-07-27 RX ADMIN — Medication 7.5 MILLIGRAM(S): at 05:41

## 2020-07-27 RX ADMIN — Medication 7.5 MILLIGRAM(S): at 13:18

## 2020-07-27 NOTE — PROGRESS NOTE ADULT - ASSESSMENT
69 year old male with past medical history of HTN, DLD, GERD, anxiety, cervical radiculopathy, chronic lower back pain presents with worsening left shoulder pain, radiating to the chest.     A/P:   Chest pain, unstable angina:   Multiple episodes of Chest pain and SOB.   CT coronaries in Nov 2018 showed moderate stenosis of LAD.   Patient reports intermittent chest pain.  Continue ASA, Coreg and Lipitor. Add Imdur   Cardiology recommended exercise nuclear stress test which was normal.     Cervical radiculopathy  left cervical and shoulder pain  Cervical spine MRI showed Severe multilevel disc bulges and degenerative changes resulting in moderate to severe spinal canal and neural foraminal stenosis most severe at the C6-C7 level with flattening of the ventral aspect of the cord on the right. Mildly increased cord signal at this level as well as the C4-C5 level likely represents chronic myelomalacia.   Refer to spine surgery outpatient. Continue Gabapentin 100mg TID.     HTN: Uncontrolled, Continue Coreg. Increase Lisinopril from 10 to 20mg daily.     Anxiety: Continue Buspirone 7.5 three times a day    GERD: Continue Pantoprazole    BPH: Continue Tamsulosin and Finasteride    #Progress Note Handoff:  Pending (specify):    Family discussion:  Disposition: Home today.
69 year old male with past medical history of HTN, DLD, GERD, anxiety, cervical radiculopathy, chronic lower back pain presents with worsening left shoulder pain, radiating to the chest.     A/P:   Chest pain, unstable angina:   Multiple episodes of Chest pain and SOB.   CT coronaries in Nov 2018 showed moderate stenosis of LAD.   Patient reports intermittent chest pain.  Continue ASA, Coreg and Lipitor.     Cervical radiculopathy  left cervical and shoulder pain  Pending cervical spine MRI.     HTN: Uncontrolled, Continue Coreg. Increase Lisinopril from 10 to 20mg daily.     Anxiety: Continue Buspirone 7.5 three times a day    GERD: Continue Pantoprazole    BPH: Continue Tamsulosin and Finasteride    #Progress Note Handoff:  Pending (specify):  Consults: Cardiology.   Family discussion:  Disposition: Home in 24 hrs.
ASSESSMENT  69 year old male with past medical history of HTN, DLD, GERD, anxiety, cervical radiculopathy, chronic lower back pain presents with worsening left shoulder pain, radiating to the chest pending Nuclear Stress test today, starting on Imdur, currently stable, BP controlled on Nifedipine + Lisinopril.       PLAN  # Chest pain, unstable angina  - Hx of multiple episodes of Chest pain and SOB.   - CT coronaries in Nov 2018 showed moderate stenosis of LAD.   - Patient reports intermittent chest pain.  - Continue ASA, Carvedilol, and Lipitor.   - Imdur after Stress test   - f/u Stress test results  - f/u Cardio reccs    # Cervical radiculopathy  - Patient still has left cervical and shoulder pain  - MRI Cervical spine:  multilevel disc bulges/degenerative changes l/t spinal canal and foraminal stenosis most severe at the C6-C7; likely  chronic myelomalacia.       # HTN  - Uncontrolled on admission, 178/86 this AM  - Continue Coreg.  - Lisinopril increased from 10 to 20mg daily  - BP at 10AM: 156/86  - add Hydralazine if BP >180    # Anxiety:   - c/w Buspirone 7.5 TID    # GERD  - c/w Pantoprazole    # BPH  - c/w Tamsulosin and Finasteride  - monitor BP, stop Tamsulosin if BP drops (d/t alpha-1 activity)       Code status: FULL CODE  Family discussion:  Disposition: Home in 24 hrs.

## 2020-07-27 NOTE — PROGRESS NOTE ADULT - SUBJECTIVE AND OBJECTIVE BOX
HPI  69 year old male with past medical history of HTN, DLD, GERD, anxiety, cervical radiculopathy, chronic lower back pain presents with worsening chest pain since this morning.    This morning patient woke up with left sided upper shoulder pain which later extended to substernal area, varies between sharp and dull in nature, 7/10 initially with nothing making it better or worse associated with shortness of breath that was unchanged with activity and some nausea with the pain. As per patient he has had similar chest for most days of last 3 years mostly early morning but this time worse than usual and decided to come to the hospital. Patient denies any cough, fever, chills, orthopnea dyspnea on exertion, abdominal pain but does endorse chronic feeling of numbness on the lateral aspect of left shoulder and anterior aspect of the left lower extremity.    Patient received 2 doses of Nitroglycerin and Aspirin 162mg by EMS with subjective improvement in pain and was brought to ED. In ED EKG showed no new changes and troponin were negative. Patient had a Coronary CTA in 2018 showed moderate stenosis of LAD but otherwise has followed with multiple cardiologist without any objective evidence of cardiac disease. Very similar admission in the past with no cardiac positive finding.    On Interview patient endorses that his pain post treatment has been 4-5/10 down from 7/10 but still persists. Otherwise denies any other active complaints. (25 Jul 2020 14:28). Currently admitted to medicine with the primary diagnosis of Chest pain. Today is hospital day 2d.     INTERVAL HPI / OVERNIGHT EVENTS:  Patient was examined and seen at bedside. This morning he is ambulating around his room, in no acute distress, but still reports consistent L shoulder pain radiating to his substernal area. Patient is going down for Exercise Stress Test today to assess for possible unstable angina. Will start patient on Imdur after stress test.     ROS: Otherwise unremarkable     PAST MEDICAL & SURGICAL HISTORY  High cholesterol  Depression  Hypertension  BPH (benign prostatic hyperplasia)  No significant past surgical history    ALLERGIES  No Known Allergies    MEDICATIONS  STANDING MEDICATIONS  aspirin  chewable 81 milliGRAM(s) Oral daily  atorvastatin 20 milliGRAM(s) Oral at bedtime  busPIRone 7.5 milliGRAM(s) Oral three times a day  carvedilol 12.5 milliGRAM(s) Oral every 12 hours  chlorhexidine 4% Liquid 1 Application(s) Topical daily  enoxaparin Injectable 40 milliGRAM(s) SubCutaneous at bedtime  finasteride 5 milliGRAM(s) Oral daily  gabapentin 100 milliGRAM(s) Oral three times a day  lisinopril 20 milliGRAM(s) Oral daily  pantoprazole    Tablet 40 milliGRAM(s) Oral before breakfast  tamsulosin 0.4 milliGRAM(s) Oral at bedtime    PRN MEDICATIONS  acetaminophen   Tablet .. 650 milliGRAM(s) Oral every 6 hours PRN    VITALS:  T(F): 96.7  HR: 78  BP: 157/86  RR: 18  SpO2: 98%    PHYSICAL EXAM  GEN: NAD, ambulating, cooperative  PULM: Clear to auscultation bilaterally, No wheezes  CVS: Regular rate and rhythm, S1-S2, no murmurs  ABD: Soft, non-tender, non-distended, no guarding  NEURO: A&Ox3, no focal deficits    LABS             14.1   5.94  )-----------( 206      ( 27 Jul 2020 06:54 )             46.1     07-27    141  |  98  |  13  ----------------------------<  112<H>  3.9   |  26  |  1.1    Ca    10.2<H>      27 Jul 2020 06:54  Mg     2.3     07-26    TPro  7.4  /  Alb  4.4  /  TBili  0.6  /  DBili  x   /  AST  30  /  ALT  51<H>  /  AlkPhos  65  07-26    Troponin T, Serum: <0.01 ng/mL (07-27-20 @ 06:54)  Troponin T, Serum: <0.01 ng/mL (07-26-20 @ 17:22)    CARDIAC MARKERS ( 27 Jul 2020 06:54 )  x     / <0.01 ng/mL / x     / x     / x      CARDIAC MARKERS ( 26 Jul 2020 17:22 )  x     / <0.01 ng/mL / x     / x     / x      CARDIAC MARKERS ( 25 Jul 2020 12:20 )  x     / <0.01 ng/mL / x     / x     / x          RADIOLOGY   MR Cervical, 7/26:  Severe multilevel disc bulges and degenerative changes resulting in moderate to severe spinal canal and neural foraminal stenosis most severe at the C6-C7 level with flattening of the ventral aspectof the cord on the right. Mildly increased cord signal at this level as well as the C4-C5 level likely represents chronic myelomalacia.    X-Ray Chest, 7/25:  No radiographic evidence of acute cardiopulmonary disease.    CT Heart 11/2018:   1.  Moderate stenosis of proximal LAD, related to mixed plaque.  2.  Total coronary calcium score is 137.  For a 68 years old male , this   corresponds to 51 PAZ percentile rank.
KAIDEN SAGE  69y  Male      Patient is a 69y old  Male who presents with a chief complaint of Left shoulder and Chest pain (25 Jul 2020 14:28)      INTERVAL HPI/OVERNIGHT EVENTS:  Still with mild chest pain, no SOB today, mild left shoulder pain.   Vital Signs Last 24 Hrs  T(C): 37.1 (26 Jul 2020 05:03), Max: 37.1 (26 Jul 2020 05:03)  T(F): 98.8 (26 Jul 2020 05:03), Max: 98.8 (26 Jul 2020 05:03)  HR: 64 (26 Jul 2020 05:03) (63 - 69)  BP: 162/73 (26 Jul 2020 05:03) (129/70 - 171/81)  BP(mean): --  RR: 18 (26 Jul 2020 05:03) (18 - 18)  SpO2: 98% (26 Jul 2020 07:57) (97% - 100%)            Consultant(s) Notes Reviewed:  [x ] YES  [ ] NO          MEDICATIONS  (STANDING):  aspirin  chewable 81 milliGRAM(s) Oral daily  atorvastatin 20 milliGRAM(s) Oral at bedtime  busPIRone 7.5 milliGRAM(s) Oral three times a day  carvedilol 12.5 milliGRAM(s) Oral every 12 hours  chlorhexidine 4% Liquid 1 Application(s) Topical daily  enoxaparin Injectable 40 milliGRAM(s) SubCutaneous at bedtime  finasteride 5 milliGRAM(s) Oral daily  gabapentin 100 milliGRAM(s) Oral three times a day  lisinopril 10 milliGRAM(s) Oral daily  pantoprazole    Tablet 40 milliGRAM(s) Oral before breakfast  tamsulosin 0.4 milliGRAM(s) Oral at bedtime    MEDICATIONS  (PRN):  acetaminophen   Tablet .. 650 milliGRAM(s) Oral every 6 hours PRN Moderate Pain (4 - 6)      LABS                          13.3   5.38  )-----------( 197      ( 26 Jul 2020 08:36 )             43.1     07-26    137  |  99  |  15  ----------------------------<  170<H>  4.4   |  27  |  1.1    Ca    9.5      26 Jul 2020 08:36  Mg     2.3     07-26    TPro  7.4  /  Alb  4.4  /  TBili  0.6  /  DBili  x   /  AST  30  /  ALT  51<H>  /  AlkPhos  65  07-26          Lactate Trend    CARDIAC MARKERS ( 25 Jul 2020 12:20 )  x     / <0.01 ng/mL / x     / x     / x          CAPILLARY BLOOD GLUCOSE            RADIOLOGY & ADDITIONAL TESTS:    Imaging Personally Reviewed:  [ ] YES  [ ] NO    HEALTH ISSUES - PROBLEM Dx:          PHYSICAL EXAM:  GENERAL: NAD, well-developed.  HEAD:  Atraumatic, Normocephalic.  EYES: EOMI, PERRLA, conjunctiva and sclera clear.  NECK: Supple, No JVD.  CHEST/LUNG: Clear to auscultation bilaterally; No wheeze.  HEART: Regular rate and rhythm; S1 S2.   ABDOMEN: Soft, Nontender, Nondistended; Bowel sounds present.  EXTREMITIES:  2+ Peripheral Pulses, No clubbing, cyanosis, or edema.  PSYCH: AAOx3.  NEUROLOGY: non-focal.  SKIN: No rashes or lesions.
KAIDEN SAGE  69y  Male      Patient is a 69y old  Male who presents with a chief complaint of Left shoulder and Chest pain (27 Jul 2020 10:40)      INTERVAL HPI/OVERNIGHT EVENTS:  He is still with chest and left shoulder pain, intermittent, mild.   Vital Signs Last 24 Hrs  T(C): 35.9 (27 Jul 2020 05:02), Max: 36.1 (26 Jul 2020 21:13)  T(F): 96.7 (27 Jul 2020 05:02), Max: 96.9 (26 Jul 2020 21:13)  HR: 78 (27 Jul 2020 09:58) (73 - 78)  BP: 157/86 (27 Jul 2020 09:58) (157/86 - 178/86)  BP(mean): --  RR: 18 (27 Jul 2020 09:58) (18 - 20)  SpO2: 98% (27 Jul 2020 09:58) (98% - 98%)            Consultant(s) Notes Reviewed:  [x ] YES  [ ] NO          MEDICATIONS  (STANDING):  aspirin  chewable 81 milliGRAM(s) Oral daily  atorvastatin 20 milliGRAM(s) Oral at bedtime  busPIRone 7.5 milliGRAM(s) Oral three times a day  carvedilol 12.5 milliGRAM(s) Oral every 12 hours  chlorhexidine 4% Liquid 1 Application(s) Topical daily  enoxaparin Injectable 40 milliGRAM(s) SubCutaneous at bedtime  finasteride 5 milliGRAM(s) Oral daily  gabapentin 100 milliGRAM(s) Oral three times a day  lisinopril 20 milliGRAM(s) Oral daily  pantoprazole    Tablet 40 milliGRAM(s) Oral before breakfast  tamsulosin 0.4 milliGRAM(s) Oral at bedtime    MEDICATIONS  (PRN):  acetaminophen   Tablet .. 650 milliGRAM(s) Oral every 6 hours PRN Moderate Pain (4 - 6)  aluminum hydroxide/magnesium hydroxide/simethicone Suspension 30 milliLiter(s) Oral every 4 hours PRN Dyspepsia      LABS                          14.1   5.94  )-----------( 206      ( 27 Jul 2020 06:54 )             46.1     07-27    141  |  98  |  13  ----------------------------<  112<H>  3.9   |  26  |  1.1    Ca    10.2<H>      27 Jul 2020 06:54  Mg     2.3     07-26    TPro  7.4  /  Alb  4.4  /  TBili  0.6  /  DBili  x   /  AST  30  /  ALT  51<H>  /  AlkPhos  65  07-26          Lactate Trend    CARDIAC MARKERS ( 27 Jul 2020 06:54 )  x     / <0.01 ng/mL / x     / x     / x      CARDIAC MARKERS ( 26 Jul 2020 17:22 )  x     / <0.01 ng/mL / x     / x     / x          CAPILLARY BLOOD GLUCOSE            RADIOLOGY & ADDITIONAL TESTS:    Imaging Personally Reviewed:  [ ] YES  [ ] NO    HEALTH ISSUES - PROBLEM Dx:          PHYSICAL EXAM:  GENERAL: NAD, well-developed.  HEAD:  Atraumatic, Normocephalic.  EYES: EOMI, PERRLA, conjunctiva and sclera clear.  NECK: Supple, No JVD.  CHEST/LUNG: Clear to auscultation bilaterally; No wheeze.  HEART: Regular rate and rhythm; S1 S2.   ABDOMEN: Soft, Nontender, Nondistended; Bowel sounds present.  EXTREMITIES:  2+ Peripheral Pulses, No clubbing, cyanosis, or edema.  PSYCH: AAOx3.  NEUROLOGY: non-focal.  SKIN: No rashes or lesions.

## 2020-07-27 NOTE — DISCHARGE NOTE PROVIDER - NSDCCPCAREPLAN_GEN_ALL_CORE_FT
PRINCIPAL DISCHARGE DIAGNOSIS  Diagnosis: Chest pain  Assessment and Plan of Treatment: You came to the hospital with left sided chest pain that radiated from your left shoulder. We began to work you up for myocardial infarction with an EKG and biomarkers that suggest a heart attack, both of which were negative. To further evaluate your chest pain, you underwent a nuclear exercise stress test which was negative for any unstable angina. Given your Cardiac CT from 2018, we added a medication to your home medications. Please take your medication as directed and follow up with your Primary care physician and Cardiologist.      SECONDARY DISCHARGE DIAGNOSES  Diagnosis: Foraminal stenosis of cervical region  Assessment and Plan of Treatment: We performed a CT scan of your cervical spine to assess for a nervous system related cause of your pain. It was noted that you had moderate to severe narrowing of your spinal canal. We added a medication to help you with your pain. Please take your medication as directed and follow up with your PCP and Neurosurgeon.    Diagnosis: Hypertension  Assessment and Plan of Treatment: Your blood pressure was high during your admission, so we increased the dose of one of your blood pressure medications. Please take your medications as directed and follow up with your PCP and Cardiologist.

## 2020-07-27 NOTE — DISCHARGE NOTE NURSING/CASE MANAGEMENT/SOCIAL WORK - PATIENT PORTAL LINK FT
You can access the FollowMyHealth Patient Portal offered by [facility] by registering at the following website: [email or web address]. By joining [employer]’s FollowMyHealth portal, you will also be able to view your health information using other applications (apps) compatible with our system.

## 2020-07-27 NOTE — DISCHARGE NOTE PROVIDER - CARE PROVIDER_API CALL
Blanca Thomas  [id number]  [address] [place] [address]  Phone: [phone]  Fax: [phone]  Follow Up Time: 1 week    Miki Velazquez)  Cardiovascular Disease; Internal Medicine; Interventional Cardiology; Nuclear Cardiology  [address] [place] [address]  Phone: [phone]  Fax: [phone]  Follow Up Time: 2 weeks    Bijan Espana  [address] [address]  Phone: [phone]  Fax: [phone]  Follow Up Time: 2 weeks

## 2020-07-27 NOTE — DISCHARGE NOTE PROVIDER - HOSPITAL COURSE
69 year old male with past medical history of HTN, DLD, GERD, anxiety, cervical radiculopathy, chronic lower back pain presents with worsening chest pain since this morning.        On morning of admission patient woke up with left sided upper shoulder pain which later extended to substernal area, varies between sharp and dull in nature, 7/10 initially with nothing making it better or worse associated with shortness of breath that was unchanged with activity and some nausea with the pain. As per patient he has had similar chest for most days of last 3 years mostly early morning but this time worse than usual and decided to come to the hospital. Patient denies any cough, fever, chills, orthopnea dyspnea on exertion, abdominal pain but does endorse chronic feeling of numbness on the lateral aspect of left shoulder and anterior aspect of the left lower extremity.        Patient received 2 doses of Nitroglycerin and Aspirin 162mg by EMS with subjective improvement in pain and was brought to ED. In ED EKG showed no new changes and troponins were negative. Patient had a Coronary CTA in 2018 showed moderate stenosis of LAD but otherwise has followed with multiple cardiologist without any objective evidence of cardiac disease. Very similar admission in the past with no cardiac positive finding.        On Interview patient endorses that his pain post treatment has been 4-5/10 down from 7/10 but still persists. Otherwise denies any other active complaints. (25 Jul 2020 14:28). Currently admitted to medicine with the primary diagnosis of Chest pain. Today is hospital day 2.         Patient was examined and seen at bedside. This morning he is ambulating around his room, in no acute distress, but still reports consistent L shoulder pain radiating to his substernal area. Patient underwent Nuclear Exercise Stress Test today to assess for possible unstable angina which was negative.         Patient is stable, eager to go home, and medically clear for discharge.

## 2020-07-27 NOTE — DISCHARGE NOTE PROVIDER - PROVIDER TOKENS
PROVIDER:[TOKEN:[[id number]],FOLLOWUP:[1 week]],PROVIDER:[TOKEN:[[id number]],FOLLOWUP:[2 weeks]],PROVIDER:[TOKEN:[[id number]],FOLLOWUP:[2 weeks]]

## 2020-07-27 NOTE — DISCHARGE NOTE PROVIDER - NSDCMRMEDTOKEN_GEN_ALL_CORE_FT
aspirin 81 mg oral tablet, chewable: 1 tab(s) orally once a day  busPIRone 7.5 mg oral tablet: 1 tab(s) orally 3 times a day  carvedilol 12.5 mg oral tablet:   finasteride 5 mg oral tablet: 1 tab(s) orally once a day  gabapentin 100 mg oral capsule: 1 cap(s) orally 3 times a day  isosorbide mononitrate 30 mg oral tablet, extended release: 1 tab(s) orally once a day   lisinopril 20 mg oral tablet: 1 tab(s) orally once a day  omeprazole 40 mg oral delayed release capsule: 1 cap(s) orally once a day  rosuvastatin 20 mg oral tablet: 1 tab(s) orally once a day  tamsulosin 0.4 mg oral capsule: 1 cap(s) orally once a day

## 2020-07-27 NOTE — DISCHARGE NOTE PROVIDER - CARE PROVIDERS DIRECT ADDRESSES
,DirectAddress_Unknown,[email or web address]

## 2020-07-29 DIAGNOSIS — R07.9 CHEST PAIN, UNSPECIFIED: ICD-10-CM

## 2020-07-29 DIAGNOSIS — I10 ESSENTIAL (PRIMARY) HYPERTENSION: ICD-10-CM

## 2020-07-29 DIAGNOSIS — M48.02 SPINAL STENOSIS, CERVICAL REGION: ICD-10-CM

## 2020-07-29 DIAGNOSIS — K21.9 GASTRO-ESOPHAGEAL REFLUX DISEASE WITHOUT ESOPHAGITIS: ICD-10-CM

## 2020-07-29 DIAGNOSIS — Z79.899 OTHER LONG TERM (CURRENT) DRUG THERAPY: ICD-10-CM

## 2020-07-29 DIAGNOSIS — N40.0 BENIGN PROSTATIC HYPERPLASIA WITHOUT LOWER URINARY TRACT SYMPTOMS: ICD-10-CM

## 2020-07-29 DIAGNOSIS — F41.9 ANXIETY DISORDER, UNSPECIFIED: ICD-10-CM

## 2020-07-29 DIAGNOSIS — E78.5 HYPERLIPIDEMIA, UNSPECIFIED: ICD-10-CM

## 2020-07-29 DIAGNOSIS — Z87.891 PERSONAL HISTORY OF NICOTINE DEPENDENCE: ICD-10-CM

## 2020-08-03 ENCOUNTER — FORM ENCOUNTER (OUTPATIENT)
Age: 70
End: 2020-08-03

## 2021-01-22 NOTE — ED ADULT TRIAGE NOTE - NS ED TRIAGE EKG FT
Patient referred for [facility] - due for repeat EGD  Please advise if office visit is needed   
Please schedule office visit to establish care with new GI MD.   
Scheduled   
Dr. Gray

## 2021-04-11 ENCOUNTER — FORM ENCOUNTER (OUTPATIENT)
Age: 71
End: 2021-04-11

## 2021-06-30 ENCOUNTER — FORM ENCOUNTER (OUTPATIENT)
Age: 71
End: 2021-06-30

## 2021-10-27 ENCOUNTER — FORM ENCOUNTER (OUTPATIENT)
Age: 71
End: 2021-10-27

## 2021-11-29 ENCOUNTER — FORM ENCOUNTER (OUTPATIENT)
Age: 71
End: 2021-11-29

## 2022-07-06 ENCOUNTER — FORM ENCOUNTER (OUTPATIENT)
Age: 72
End: 2022-07-06

## 2022-10-19 PROBLEM — E78.00 PURE HYPERCHOLESTEROLEMIA, UNSPECIFIED: Chronic | Status: ACTIVE | Noted: 2020-07-25

## 2022-10-20 ENCOUNTER — EMERGENCY (EMERGENCY)
Facility: HOSPITAL | Age: 72
LOS: 0 days | Discharge: NURSING, REHAB OR LONG-TERM CARE | End: 2022-10-20
Admitting: EMERGENCY MEDICINE

## 2022-10-20 ENCOUNTER — INPATIENT (INPATIENT)
Facility: HOSPITAL | Age: 72
LOS: 0 days | Discharge: HOME | End: 2022-10-21
Attending: INTERNAL MEDICINE | Admitting: INTERNAL MEDICINE

## 2022-10-20 VITALS
HEART RATE: 69 BPM | WEIGHT: 169.98 LBS | OXYGEN SATURATION: 99 % | SYSTOLIC BLOOD PRESSURE: 192 MMHG | HEIGHT: 68 IN | RESPIRATION RATE: 20 BRPM | TEMPERATURE: 97 F | DIASTOLIC BLOOD PRESSURE: 93 MMHG

## 2022-10-20 DIAGNOSIS — K21.9 GASTRO-ESOPHAGEAL REFLUX DISEASE WITHOUT ESOPHAGITIS: ICD-10-CM

## 2022-10-20 DIAGNOSIS — Z87.891 PERSONAL HISTORY OF NICOTINE DEPENDENCE: ICD-10-CM

## 2022-10-20 DIAGNOSIS — R07.9 CHEST PAIN, UNSPECIFIED: ICD-10-CM

## 2022-10-20 DIAGNOSIS — N40.0 BENIGN PROSTATIC HYPERPLASIA WITHOUT LOWER URINARY TRACT SYMPTOMS: ICD-10-CM

## 2022-10-20 DIAGNOSIS — I10 ESSENTIAL (PRIMARY) HYPERTENSION: ICD-10-CM

## 2022-10-20 DIAGNOSIS — I16.0 HYPERTENSIVE URGENCY: ICD-10-CM

## 2022-10-20 DIAGNOSIS — E78.00 PURE HYPERCHOLESTEROLEMIA, UNSPECIFIED: ICD-10-CM

## 2022-10-20 DIAGNOSIS — Z79.82 LONG TERM (CURRENT) USE OF ASPIRIN: ICD-10-CM

## 2022-10-20 DIAGNOSIS — I25.10 ATHEROSCLEROTIC HEART DISEASE OF NATIVE CORONARY ARTERY WITHOUT ANGINA PECTORIS: ICD-10-CM

## 2022-10-20 LAB
ALBUMIN SERPL ELPH-MCNC: 4.3 G/DL — SIGNIFICANT CHANGE UP (ref 3.5–5.2)
ALP SERPL-CCNC: 80 U/L — SIGNIFICANT CHANGE UP (ref 30–115)
ALT FLD-CCNC: 31 U/L — SIGNIFICANT CHANGE UP (ref 0–41)
ANION GAP SERPL CALC-SCNC: 9 MMOL/L — SIGNIFICANT CHANGE UP (ref 7–14)
AST SERPL-CCNC: 23 U/L — SIGNIFICANT CHANGE UP (ref 0–41)
BASOPHILS # BLD AUTO: 0.05 K/UL — SIGNIFICANT CHANGE UP (ref 0–0.2)
BASOPHILS NFR BLD AUTO: 0.7 % — SIGNIFICANT CHANGE UP (ref 0–1)
BILIRUB SERPL-MCNC: 0.6 MG/DL — SIGNIFICANT CHANGE UP (ref 0.2–1.2)
BUN SERPL-MCNC: 17 MG/DL — SIGNIFICANT CHANGE UP (ref 10–20)
CALCIUM SERPL-MCNC: 9.3 MG/DL — SIGNIFICANT CHANGE UP (ref 8.4–10.5)
CHLORIDE SERPL-SCNC: 98 MMOL/L — SIGNIFICANT CHANGE UP (ref 98–110)
CO2 SERPL-SCNC: 28 MMOL/L — SIGNIFICANT CHANGE UP (ref 17–32)
CREAT SERPL-MCNC: 1.4 MG/DL — SIGNIFICANT CHANGE UP (ref 0.7–1.5)
EGFR: 53 ML/MIN/1.73M2 — LOW
EOSINOPHIL # BLD AUTO: 0.23 K/UL — SIGNIFICANT CHANGE UP (ref 0–0.7)
EOSINOPHIL NFR BLD AUTO: 3.3 % — SIGNIFICANT CHANGE UP (ref 0–8)
GLUCOSE SERPL-MCNC: 197 MG/DL — HIGH (ref 70–99)
HCT VFR BLD CALC: 39.7 % — LOW (ref 42–52)
HGB BLD-MCNC: 12.3 G/DL — LOW (ref 14–18)
IMM GRANULOCYTES NFR BLD AUTO: 0.1 % — SIGNIFICANT CHANGE UP (ref 0.1–0.3)
LYMPHOCYTES # BLD AUTO: 1.45 K/UL — SIGNIFICANT CHANGE UP (ref 1.2–3.4)
LYMPHOCYTES # BLD AUTO: 20.7 % — SIGNIFICANT CHANGE UP (ref 20.5–51.1)
MAGNESIUM SERPL-MCNC: 2.1 MG/DL — SIGNIFICANT CHANGE UP (ref 1.8–2.4)
MCHC RBC-ENTMCNC: 22.8 PG — LOW (ref 27–31)
MCHC RBC-ENTMCNC: 31 G/DL — LOW (ref 32–37)
MCV RBC AUTO: 73.7 FL — LOW (ref 80–94)
MONOCYTES # BLD AUTO: 0.45 K/UL — SIGNIFICANT CHANGE UP (ref 0.1–0.6)
MONOCYTES NFR BLD AUTO: 6.4 % — SIGNIFICANT CHANGE UP (ref 1.7–9.3)
NEUTROPHILS # BLD AUTO: 4.8 K/UL — SIGNIFICANT CHANGE UP (ref 1.4–6.5)
NEUTROPHILS NFR BLD AUTO: 68.8 % — SIGNIFICANT CHANGE UP (ref 42.2–75.2)
NRBC # BLD: 0 /100 WBCS — SIGNIFICANT CHANGE UP (ref 0–0)
NT-PROBNP SERPL-SCNC: 158 PG/ML — SIGNIFICANT CHANGE UP (ref 0–300)
PLATELET # BLD AUTO: 191 K/UL — SIGNIFICANT CHANGE UP (ref 130–400)
POTASSIUM SERPL-MCNC: 3.5 MMOL/L — SIGNIFICANT CHANGE UP (ref 3.5–5)
POTASSIUM SERPL-SCNC: 3.5 MMOL/L — SIGNIFICANT CHANGE UP (ref 3.5–5)
PROT SERPL-MCNC: 7.5 G/DL — SIGNIFICANT CHANGE UP (ref 6–8)
RBC # BLD: 5.39 M/UL — SIGNIFICANT CHANGE UP (ref 4.7–6.1)
RBC # FLD: 15.1 % — HIGH (ref 11.5–14.5)
SARS-COV-2 RNA SPEC QL NAA+PROBE: SIGNIFICANT CHANGE UP
SODIUM SERPL-SCNC: 135 MMOL/L — SIGNIFICANT CHANGE UP (ref 135–146)
TROPONIN T SERPL-MCNC: <0.01 NG/ML — SIGNIFICANT CHANGE UP
TROPONIN T SERPL-MCNC: <0.01 NG/ML — SIGNIFICANT CHANGE UP
WBC # BLD: 6.99 K/UL — SIGNIFICANT CHANGE UP (ref 4.8–10.8)
WBC # FLD AUTO: 6.99 K/UL — SIGNIFICANT CHANGE UP (ref 4.8–10.8)

## 2022-10-20 PROCEDURE — 93010 ELECTROCARDIOGRAM REPORT: CPT | Mod: 77

## 2022-10-20 PROCEDURE — 93010 ELECTROCARDIOGRAM REPORT: CPT

## 2022-10-20 PROCEDURE — 71045 X-RAY EXAM CHEST 1 VIEW: CPT | Mod: 26

## 2022-10-20 PROCEDURE — 99285 EMERGENCY DEPT VISIT HI MDM: CPT | Mod: FS

## 2022-10-20 PROCEDURE — 99222 1ST HOSP IP/OBS MODERATE 55: CPT | Mod: AI

## 2022-10-20 RX ORDER — LOSARTAN POTASSIUM 100 MG/1
1 TABLET, FILM COATED ORAL
Qty: 0 | Refills: 0 | DISCHARGE

## 2022-10-20 RX ORDER — PANTOPRAZOLE SODIUM 20 MG/1
40 TABLET, DELAYED RELEASE ORAL
Refills: 0 | Status: DISCONTINUED | OUTPATIENT
Start: 2022-10-20 | End: 2022-10-21

## 2022-10-20 RX ORDER — ASPIRIN/CALCIUM CARB/MAGNESIUM 324 MG
81 TABLET ORAL DAILY
Refills: 0 | Status: DISCONTINUED | OUTPATIENT
Start: 2022-10-20 | End: 2022-10-21

## 2022-10-20 RX ORDER — FINASTERIDE 5 MG/1
5 TABLET, FILM COATED ORAL DAILY
Refills: 0 | Status: DISCONTINUED | OUTPATIENT
Start: 2022-10-20 | End: 2022-10-21

## 2022-10-20 RX ORDER — TAMSULOSIN HYDROCHLORIDE 0.4 MG/1
0.4 CAPSULE ORAL AT BEDTIME
Refills: 0 | Status: DISCONTINUED | OUTPATIENT
Start: 2022-10-20 | End: 2022-10-21

## 2022-10-20 RX ORDER — ENOXAPARIN SODIUM 100 MG/ML
40 INJECTION SUBCUTANEOUS EVERY 24 HOURS
Refills: 0 | Status: DISCONTINUED | OUTPATIENT
Start: 2022-10-20 | End: 2022-10-21

## 2022-10-20 RX ORDER — MORPHINE SULFATE 50 MG/1
1 CAPSULE, EXTENDED RELEASE ORAL ONCE
Refills: 0 | Status: DISCONTINUED | OUTPATIENT
Start: 2022-10-20 | End: 2022-10-20

## 2022-10-20 RX ORDER — ATORVASTATIN CALCIUM 80 MG/1
80 TABLET, FILM COATED ORAL AT BEDTIME
Refills: 0 | Status: DISCONTINUED | OUTPATIENT
Start: 2022-10-20 | End: 2022-10-21

## 2022-10-20 RX ORDER — LISINOPRIL 2.5 MG/1
20 TABLET ORAL DAILY
Refills: 0 | Status: DISCONTINUED | OUTPATIENT
Start: 2022-10-20 | End: 2022-10-21

## 2022-10-20 RX ORDER — GABAPENTIN 400 MG/1
100 CAPSULE ORAL THREE TIMES A DAY
Refills: 0 | Status: DISCONTINUED | OUTPATIENT
Start: 2022-10-20 | End: 2022-10-21

## 2022-10-20 RX ORDER — HYDRALAZINE HCL 50 MG
20 TABLET ORAL ONCE
Refills: 0 | Status: COMPLETED | OUTPATIENT
Start: 2022-10-20 | End: 2022-10-20

## 2022-10-20 RX ORDER — LOSARTAN POTASSIUM 100 MG/1
100 TABLET, FILM COATED ORAL DAILY
Refills: 0 | Status: DISCONTINUED | OUTPATIENT
Start: 2022-10-20 | End: 2022-10-21

## 2022-10-20 RX ADMIN — MORPHINE SULFATE 1 MILLIGRAM(S): 50 CAPSULE, EXTENDED RELEASE ORAL at 22:27

## 2022-10-20 RX ADMIN — Medication 20 MILLIGRAM(S): at 21:20

## 2022-10-20 NOTE — ED ADULT NURSE REASSESSMENT NOTE - NS ED NURSE REASSESS COMMENT FT1
Pt reports sudden increase in chest pain; EKG being redone currently; MD Souza notified; MIC Perez at bedside.

## 2022-10-20 NOTE — ED PROVIDER NOTE - OBJECTIVE STATEMENT
73 yo male, pmh of htn, hld, presents to ed for cp, midsternal, mild, aching, no radiation, x 1 day. Denies fever, chills, sob, abd pain, nvd, dizziness, syncope. pt had ccta 2018 cad rads3 and nl stress in 2020.

## 2022-10-20 NOTE — ED PROVIDER NOTE - ATTENDING APP SHARED VISIT CONTRIBUTION OF CARE
72-year-old male with past medical history of high blood pressure, hyperlipidemia, BPH, GERD, herniated disc  this presents with left-sided chest pain described as heaviness since yesterday radiating to his left shoulder, intermittent, mild to moderate in intensity, at times worse with movement, better at rest.  Patient reports he had a stress test about a year ago with his cardiologist Dr. York that he believes was negative.  In PACS patient did have a stress test in 2022 that was normal, patient had a 16 PA CCTA in 2018 with a score of 3 showing moderate stenosis of the proximal LAD related to mixed plaque with a total coronary calcium score of 137.  Patient is an ex-smoker, quit more than 40 years ago.  No fever, chills, n/v, sob, pleuritic cp, palpitations, diaphoresis, cough, ha/lh/dizziness, numbness/tingling, neck pain/ stiffness, abd pain, diarrhea, constipation, melena/brbpr, urinary symptoms, trauma, weakness, edema, calf pain/swelling/erythema, sick contacts, recent travel or rash.     on exam: non toxic appearing pt sitting on stretcher speaking full sentences, no rash, mmm, neck supple. non-tender , radial pulses 2/4 b/l, no jvd, no pain to palpation to chest wall, no pain to palpation to shoulders with FROM, (-) drop arm test. No spinous ttp, no palpable shelves or step offs. ctabl w/ breath sounds present b/l, no wheezing or crackles,no accessory muscle use, no tachypnea, no stridor, bs present throughout all 4 quadrants, abd soft, nd, nt, no rebound tenderness or guarding, no cvat, FROM of ext, no calf pain/swelling/erythema, AAOx3. motor 5/5 and sensation intact throughout upper and lower ext. no focal deficits.    Plan: EKG, CXR, labs, reassess.

## 2022-10-20 NOTE — H&P ADULT - HISTORY OF PRESENT ILLNESS
Patient is a 72 y.o M with a pmhx of HTN, HLD who presents complaining of midsternal chest pain described as "crushing/stabbing" radiating to the left shoulder that began this afternoon while cooking. States pleuritic component. Patient states the pain has been on and off for the past 1 year. Last followed up with cardiologist Dr. Pawan York few months ago. Unsure when last echocardiogram was. Denies n/v/d, shortness of breath, denies aggravating or relieving factors.

## 2022-10-20 NOTE — H&P ADULT - NSHPSOCIALHISTORY_GEN_ALL_CORE
Substance Use (street drugs): ( x ) never used  (  ) other:  Tobacco Usage:  ( x  ) never smoked   (   ) former smoker   (   ) current smoker  (     ) pack year  Alcohol Usage: None Substance Use (street drugs): ( x ) never used  (  ) other:  Tobacco Usage:  ( X  ) former smoker   (   ) current smoker  (     ) pack year  Alcohol Usage: None

## 2022-10-20 NOTE — H&P ADULT - NSHPLABSRESULTS_GEN_ALL_CORE
LABS:                        12.3   6.99  )-----------( 191      ( 20 Oct 2022 17:35 )             39.7     10-20    135  |  98  |  17  ----------------------------<  197<H>  3.5   |  28  |  1.4    Ca    9.3      20 Oct 2022 17:35  Mg     2.1     10-20    TPro  7.5  /  Alb  4.3  /  TBili  0.6  /  DBili  x   /  AST  23  /  ALT  31  /  AlkPhos  80  10-20    LIVER FUNCTIONS - ( 20 Oct 2022 17:35 )  Alb: 4.3 g/dL / Pro: 7.5 g/dL / ALK PHOS: 80 U/L / ALT: 31 U/L / AST: 23 U/L / GGT: x LABS:                        12.3   6.99  )-----------( 191      ( 20 Oct 2022 17:35 )             39.7     10-20    135  |  98  |  17  ----------------------------<  197<H>  3.5   |  28  |  1.4    Ca    9.3      20 Oct 2022 17:35  Mg     2.1     10-20    TPro  7.5  /  Alb  4.3  /  TBili  0.6  /  DBili  x   /  AST  23  /  ALT  31  /  AlkPhos  80  10-20    LIVER FUNCTIONS - ( 20 Oct 2022 17:35 )  Alb: 4.3 g/dL / Pro: 7.5 g/dL / ALK PHOS: 80 U/L / ALT: 31 U/L / AST: 23 U/L / GGT: x  +++++++++++++++++++++++++++++++++++++++++++++++++++++++++++++++  < from: NM Nuclear Stress Multiple (07.27.20 @ 15:00) >    Impression:  1. NORMAL STRESS AND REST MYOCARDIAL PERFUSION SPECT TOMOGRAPHY, WITH NO EVIDENCE FOR ISCHEMIA AT THE LEVEL OF EXERCISE ATTAINED.  2. NORMAL RESTING LEFT VENTRICULAR WALL MOTION AND WALL THICKENING.  3. LEFT VENTRICULAR EJECTION FRACTION OF 74 % WHICH IS WITHIN RANGE OF NORMAL.    < end of copied text >

## 2022-10-20 NOTE — ED PROVIDER NOTE - PROGRESS NOTE DETAILS
ED Attending ELBERT Frias  Patient aware of all results, agrees with plan for admission.  Medical admitting team aware of patient and admission.

## 2022-10-20 NOTE — H&P ADULT - NSHPPHYSICALEXAM_GEN_ALL_CORE
Vital Signs Last 24 Hrs  T(C): 36.2 (20 Oct 2022 17:36), Max: 36.2 (20 Oct 2022 17:36)  T(F): 97.2 (20 Oct 2022 17:36), Max: 97.2 (20 Oct 2022 17:36)  HR: 69 (20 Oct 2022 17:36) (69 - 69)  BP: 192/93 (20 Oct 2022 17:36) (192/93 - 192/93)  RR: 20 (20 Oct 2022 17:36) (20 - 20)  SpO2: 99% (20 Oct 2022 17:36) (99% - 99%)    Parameters below as of 20 Oct 2022 17:36  Patient On (Oxygen Delivery Method): room air      GENERAL: NAD, lying in bed comfortably  HEAD:  Atraumatic, Normocephalic  EYES: EOMI, PERRLA, conjunctiva and sclera clear  ENT: Moist mucous membranes  NECK: Supple, No JVD  CHEST/LUNG: Clear to auscultation bilaterally; No rales, rhonchi, wheezing, or rubs. Unlabored respirations  HEART: Regular rate and rhythm; No murmurs, rubs, or gallops  ABDOMEN: Bowel sounds present; Soft, Nontender, Nondistended. No hepatomegally  EXTREMITIES:  2+ Peripheral Pulses, brisk capillary refill. No clubbing, cyanosis, or edema  NERVOUS SYSTEM:  Alert & Oriented X3, speech clear. No deficits   MSK: FROM all 4 extremities, full and equal strength  SKIN: No rashes or lesions

## 2022-10-21 ENCOUNTER — TRANSCRIPTION ENCOUNTER (OUTPATIENT)
Age: 72
End: 2022-10-21

## 2022-10-21 VITALS
OXYGEN SATURATION: 99 % | SYSTOLIC BLOOD PRESSURE: 158 MMHG | DIASTOLIC BLOOD PRESSURE: 91 MMHG | HEART RATE: 69 BPM | RESPIRATION RATE: 18 BRPM | TEMPERATURE: 97 F

## 2022-10-21 LAB
ANION GAP SERPL CALC-SCNC: 11 MMOL/L — SIGNIFICANT CHANGE UP (ref 7–14)
BUN SERPL-MCNC: 15 MG/DL — SIGNIFICANT CHANGE UP (ref 10–20)
CALCIUM SERPL-MCNC: 9.7 MG/DL — SIGNIFICANT CHANGE UP (ref 8.4–10.5)
CHLORIDE SERPL-SCNC: 106 MMOL/L — SIGNIFICANT CHANGE UP (ref 98–110)
CK MB CFR SERPL CALC: 1.1 NG/ML — SIGNIFICANT CHANGE UP (ref 0.6–6.3)
CK SERPL-CCNC: 142 U/L — SIGNIFICANT CHANGE UP (ref 0–225)
CO2 SERPL-SCNC: 27 MMOL/L — SIGNIFICANT CHANGE UP (ref 17–32)
CREAT SERPL-MCNC: 1.1 MG/DL — SIGNIFICANT CHANGE UP (ref 0.7–1.5)
EGFR: 71 ML/MIN/1.73M2 — SIGNIFICANT CHANGE UP
GLUCOSE SERPL-MCNC: 127 MG/DL — HIGH (ref 70–99)
HCT VFR BLD CALC: 42.1 % — SIGNIFICANT CHANGE UP (ref 42–52)
HGB BLD-MCNC: 13.2 G/DL — LOW (ref 14–18)
MCHC RBC-ENTMCNC: 22.9 PG — LOW (ref 27–31)
MCHC RBC-ENTMCNC: 31.4 G/DL — LOW (ref 32–37)
MCV RBC AUTO: 73.1 FL — LOW (ref 80–94)
NRBC # BLD: 0 /100 WBCS — SIGNIFICANT CHANGE UP (ref 0–0)
PLATELET # BLD AUTO: 189 K/UL — SIGNIFICANT CHANGE UP (ref 130–400)
POTASSIUM SERPL-MCNC: 3.7 MMOL/L — SIGNIFICANT CHANGE UP (ref 3.5–5)
POTASSIUM SERPL-SCNC: 3.7 MMOL/L — SIGNIFICANT CHANGE UP (ref 3.5–5)
RBC # BLD: 5.76 M/UL — SIGNIFICANT CHANGE UP (ref 4.7–6.1)
RBC # FLD: 15.1 % — HIGH (ref 11.5–14.5)
SODIUM SERPL-SCNC: 144 MMOL/L — SIGNIFICANT CHANGE UP (ref 135–146)
TROPONIN T SERPL-MCNC: <0.01 NG/ML — SIGNIFICANT CHANGE UP
WBC # BLD: 7.77 K/UL — SIGNIFICANT CHANGE UP (ref 4.8–10.8)
WBC # FLD AUTO: 7.77 K/UL — SIGNIFICANT CHANGE UP (ref 4.8–10.8)

## 2022-10-21 PROCEDURE — 99239 HOSP IP/OBS DSCHRG MGMT >30: CPT

## 2022-10-21 PROCEDURE — 93306 TTE W/DOPPLER COMPLETE: CPT | Mod: 26

## 2022-10-21 PROCEDURE — 99221 1ST HOSP IP/OBS SF/LOW 40: CPT

## 2022-10-21 RX ORDER — CARVEDILOL PHOSPHATE 80 MG/1
1 CAPSULE, EXTENDED RELEASE ORAL
Qty: 0 | Refills: 0 | DISCHARGE

## 2022-10-21 RX ORDER — ASPIRIN/CALCIUM CARB/MAGNESIUM 324 MG
81 TABLET ORAL
Refills: 0 | Status: DISCONTINUED | OUTPATIENT
Start: 2022-10-21 | End: 2022-10-21

## 2022-10-21 RX ORDER — CARVEDILOL PHOSPHATE 80 MG/1
0 CAPSULE, EXTENDED RELEASE ORAL
Qty: 0 | Refills: 0 | DISCHARGE

## 2022-10-21 RX ORDER — ISOSORBIDE MONONITRATE 60 MG/1
1 TABLET, EXTENDED RELEASE ORAL
Qty: 14 | Refills: 0
Start: 2022-10-21 | End: 2022-11-03

## 2022-10-21 RX ORDER — LABETALOL HCL 100 MG
10 TABLET ORAL ONCE
Refills: 0 | Status: COMPLETED | OUTPATIENT
Start: 2022-10-21 | End: 2022-10-21

## 2022-10-21 RX ORDER — ASPIRIN/CALCIUM CARB/MAGNESIUM 324 MG
1 TABLET ORAL
Qty: 0 | Refills: 0 | DISCHARGE

## 2022-10-21 RX ORDER — CARVEDILOL PHOSPHATE 80 MG/1
12.5 CAPSULE, EXTENDED RELEASE ORAL DAILY
Refills: 0 | Status: DISCONTINUED | OUTPATIENT
Start: 2022-10-21 | End: 2022-10-21

## 2022-10-21 RX ORDER — ACETAMINOPHEN 500 MG
650 TABLET ORAL EVERY 6 HOURS
Refills: 0 | Status: DISCONTINUED | OUTPATIENT
Start: 2022-10-21 | End: 2022-10-21

## 2022-10-21 RX ORDER — ISOSORBIDE MONONITRATE 60 MG/1
60 TABLET, EXTENDED RELEASE ORAL DAILY
Refills: 0 | Status: DISCONTINUED | OUTPATIENT
Start: 2022-10-21 | End: 2022-10-21

## 2022-10-21 RX ADMIN — ISOSORBIDE MONONITRATE 60 MILLIGRAM(S): 60 TABLET, EXTENDED RELEASE ORAL at 11:32

## 2022-10-21 RX ADMIN — LOSARTAN POTASSIUM 100 MILLIGRAM(S): 100 TABLET, FILM COATED ORAL at 06:13

## 2022-10-21 RX ADMIN — Medication 650 MILLIGRAM(S): at 04:00

## 2022-10-21 RX ADMIN — Medication 10 MILLIGRAM(S): at 03:01

## 2022-10-21 RX ADMIN — LISINOPRIL 20 MILLIGRAM(S): 2.5 TABLET ORAL at 06:12

## 2022-10-21 RX ADMIN — ENOXAPARIN SODIUM 40 MILLIGRAM(S): 100 INJECTION SUBCUTANEOUS at 06:12

## 2022-10-21 RX ADMIN — Medication 7.5 MILLIGRAM(S): at 06:12

## 2022-10-21 RX ADMIN — Medication 81 MILLIGRAM(S): at 07:12

## 2022-10-21 RX ADMIN — CARVEDILOL PHOSPHATE 12.5 MILLIGRAM(S): 80 CAPSULE, EXTENDED RELEASE ORAL at 06:11

## 2022-10-21 RX ADMIN — FINASTERIDE 5 MILLIGRAM(S): 5 TABLET, FILM COATED ORAL at 11:33

## 2022-10-21 RX ADMIN — PANTOPRAZOLE SODIUM 40 MILLIGRAM(S): 20 TABLET, DELAYED RELEASE ORAL at 06:13

## 2022-10-21 RX ADMIN — Medication 650 MILLIGRAM(S): at 03:00

## 2022-10-21 RX ADMIN — GABAPENTIN 100 MILLIGRAM(S): 400 CAPSULE ORAL at 06:12

## 2022-10-21 NOTE — DISCHARGE NOTE PROVIDER - HOSPITAL COURSE
71yo M with PMHx of HTN, HLD, CAD, stable angina, BPH and GERD presents for worsening CP. Found to have nonischemic EKG with negative cardiac enzymes x3. TTE performed. Cardiology consulted, recommend increasing Imdur to 60 mg and outpatient stress test with patient's cardiologist.      TTE Echo Complete w/o Contrast w/ Doppler (10.21.22 @ 09:08)  Summary:   1. LV Ejection Fraction by Sparrow's Method with a biplane EF of 77 %.   2. Normal left atrial size.   3. There is no evidence of pericardial effusion.   4. Mild mitral valve regurgitation.   5. Structurally normal mitral valve, with normal leaflet excursion.   6. Structurally normal tricuspid valve, with normal leaflet excursion.   7. Normal trileaflet aortic valve with normal opening.      Patient is medically stable for dc with outpatient follow up.     71yo M with PMHx of HTN, HLD, CAD, stable angina, BPH and GERD presents for worsening CP. Found to have nonischemic EKG with negative cardiac enzymes x3. TTE performed. Cardiology consulted, recommend increasing Imdur to 60 mg and outpatient stress test with patient's cardiologist.      TTE Echo Complete w/o Contrast w/ Doppler (10.21.22 @ 09:08)  Summary:   1. LV Ejection Fraction by Sparrow's Method with a biplane EF of 77 %.   2. Normal left atrial size.   3. There is no evidence of pericardial effusion.   4. Mild mitral valve regurgitation.   5. Structurally normal mitral valve, with normal leaflet excursion.   6. Structurally normal tricuspid valve, with normal leaflet excursion.   7. Normal trileaflet aortic valve with normal opening.      #Dahabra: pt has chronic vague chest pain, CE neg, no ischemic changes in ekg, echo wnl, seen by cardio, outpt eval by his cardiologist   Patient is medically stable for dc with outpatient follow up.

## 2022-10-21 NOTE — PATIENT PROFILE ADULT - FALL HARM RISK - HARM RISK INTERVENTIONS

## 2022-10-21 NOTE — DISCHARGE NOTE NURSING/CASE MANAGEMENT/SOCIAL WORK - NSDCPEFALRISK_GEN_ALL_CORE
For information on Fall & Injury Prevention, visit: [email or web address]  [email or web address] OR  https://www.cdc.gov/steadi/patient.html

## 2022-10-21 NOTE — DISCHARGE NOTE PROVIDER - NSDCMRMEDTOKEN_GEN_ALL_CORE_FT
aspirin 81 mg oral delayed release tablet: 1 tab(s) orally 2 times a day  busPIRone 7.5 mg oral tablet: 1 tab(s) orally 3 times a day  carvedilol 12.5 mg oral tablet: 1 tab(s) orally 2 times a day  finasteride 5 mg oral tablet: 1 tab(s) orally once a day  gabapentin 100 mg oral capsule: 1 cap(s) orally 3 times a day  isosorbide mononitrate 60 mg oral tablet, extended release: 1 tab(s) orally once a day  losartan 100 mg oral tablet: 1 tab(s) orally once a day  omeprazole 40 mg oral delayed release capsule: 1 cap(s) orally once a day  rosuvastatin 20 mg oral tablet: 1 tab(s) orally once a day  tamsulosin 0.4 mg oral capsule: 1 cap(s) orally once a day

## 2022-10-21 NOTE — DISCHARGE NOTE PROVIDER - NSDCFUSCHEDAPPT_GEN_ALL_CORE_FT
Pawan York  [facility]ician [facility]  CARDIOLOGY [address]  Scheduled Appointment: 10/27/2022

## 2022-10-21 NOTE — DISCHARGE NOTE PROVIDER - CARE PROVIDER_API CALL
Pawan York)  Cardiology; Internal Medicine; Interventional Cardiology  [address] [place] [address]  Phone: [phone]  Fax: [phone]  Follow Up Time: 1 week    Blanca Thomas  Internal Medicine  [address] [place] [id number]  Phone: [phone]  Fax: [phone]  Follow Up Time: 1 week

## 2022-10-21 NOTE — CONSULT NOTE ADULT - ASSESSMENT
71yo M with PMHx of HTN, HLD, CAD, stable angina, BPH and GERD presents for worsening CP. Found to have nonischemic EKG with negative cardiac enzymes x3. May represent stable angina vs. unstable angina.     Angina:   -Can check TTE.   -Increase imdur 30mg PO to 60mg PO daily.  -Continue on ASA, coreg 12.5mg PO BID, atorvastatin 80mg PO daily.   -Check lipids, HA1c and TSH.   -Can defer stress testing vs anatomic coronary testing to outpatient.   -Can optimize antianginals as outpatient.    HTN: Not at goal  -Increase imdur as above.  -Can consider increasing coreg as well.     HLD: Check lipids  -Atorvastatin as above.     Discussed with patient and outpatient cardiologist

## 2022-10-21 NOTE — DISCHARGE NOTE PROVIDER - NSDCCPCAREPLAN_GEN_ALL_CORE_FT
PRINCIPAL DISCHARGE DIAGNOSIS  Diagnosis: Chest pain  Assessment and Plan of Treatment: Nonischemic EKG with negative cardiac enzymes x3. TTE performed, normal. Cardiology consulted, recommend increasing Imdur to 60 mg and outpatient stress test with cardiologist.

## 2022-10-21 NOTE — DISCHARGE NOTE NURSING/CASE MANAGEMENT/SOCIAL WORK - PATIENT PORTAL LINK FT
You can access the FollowMyHealth Patient Portal offered by [facility] by registering at the following website: [email or web address]. By joining [employer]’s FollowMyHealth portal, you will also be able to view your health information using other applications (apps) compatible with our system.

## 2022-10-21 NOTE — CONSULT NOTE ADULT - SUBJECTIVE AND OBJECTIVE BOX
HPI:  Patient is a 72 y.o M with a pmhx of HTN, HLD who presents complaining of midsternal chest pain described as "crushing/stabbing" radiating to the left shoulder that began this afternoon while cooking. States pleuritic component. Patient states the pain has been on and off for the past 1 year. Last followed up with cardiologist Dr. Pawan York few months ago. Unsure when last echocardiogram was. Denies n/v/d, shortness of breath, denies aggravating or relieving factors.   (20 Oct 2022 21:04)    Patient has been having worsening CP over the last few weeks. He states it starts in his left shoulder and will radiate into his chest. Will last for 10+ minutes and will yosvany with rest. He denies associated SOB, RAHMAN, palpitations. Denies ever having had MI, LHC. Follows with Dr. Horner and has appointment with him next week.       ROS: A 10-point review of systems was otherwise negative.    PAST MEDICAL & SURGICAL HISTORY:  BPH (benign prostatic hyperplasia)      Hypertension      Depression      High cholesterol      No significant past surgical history          SOCIAL HISTORY: Denies smoking, illicit drug use.   FAMILY HISTORY:  No pertinent family history in first degree relatives        ALLERGIES: 	  No Known Allergies            MEDICATIONS:  acetaminophen     Tablet .. 650 milliGRAM(s) Oral every 6 hours PRN  aspirin enteric coated 81 milliGRAM(s) Oral two times a day  atorvastatin 80 milliGRAM(s) Oral at bedtime  busPIRone 7.5 milliGRAM(s) Oral three times a day  carvedilol 12.5 milliGRAM(s) Oral daily  enoxaparin Injectable 40 milliGRAM(s) SubCutaneous every 24 hours  finasteride 5 milliGRAM(s) Oral daily  gabapentin 100 milliGRAM(s) Oral three times a day  losartan 100 milliGRAM(s) Oral daily  pantoprazole    Tablet 40 milliGRAM(s) Oral before breakfast  tamsulosin 0.4 milliGRAM(s) Oral at bedtime      PHYSICAL EXAM:  T(C): 36 (10-21-22 @ 06:32), Max: 36.2 (10-20-22 @ 17:36)  HR: 69 (10-21-22 @ 06:32) (68 - 97)  BP: 158/91 (10-21-22 @ 06:32) (148/86 - 217/99)  RR: 18 (10-21-22 @ 06:32) (17 - 20)  SpO2: 99% (10-21-22 @ 06:32) (98% - 99%)  Wt(kg): --    GEN: Awake, comfortable. NAD.   HEENT: NCAT, PERRL, EOMI. Mucosa moist. No JVD.   RESP: CTA b/l  CV: RRR, normal s1/s2. No m/r/g.  ABD: Soft, NTND. BS+  EXT: Warm. No edema, clubbing, or cyanosis.   NEURO: AAOx3. No focal deficits.  Psych: Euthymic.   Skin: Dry. Intact. No rash.     I&O's Summary    Height (cm): 172.7 (10-20 @ 17:36)  Weight (kg): 77.1 (10-20 @ 17:36)  BMI (kg/m2): 25.9 (10-20 @ 17:36)  BSA (m2): 1.91 (10-20 @ 17:36)  	  LABS:	 	    CARDIAC MARKERS:  CARDIAC MARKERS ( 21 Oct 2022 08:37 )  x     / <0.01 ng/mL / x     / x     / 1.1 ng/mL  CARDIAC MARKERS ( 20 Oct 2022 20:32 )  x     / <0.01 ng/mL / x     / x     / x      CARDIAC MARKERS ( 20 Oct 2022 17:35 )  x     / <0.01 ng/mL / x     / x     / x                                13.2   7.77  )-----------( 189      ( 21 Oct 2022 08:37 )             42.1     10-20    135  |  98  |  17  ----------------------------<  197<H>  3.5   |  28  |  1.4    Ca    9.3      20 Oct 2022 17:35  Mg     2.1     10-20    TPro  7.5  /  Alb  4.3  /  TBili  0.6  /  DBili  x   /  AST  23  /  ALT  31  /  AlkPhos  80  10-20    proBNP: Serum Pro-Brain Natriuretic Peptide: 158 pg/mL (10-20 @ 17:35)    Lipid Profile:   HgA1c:   TSH:     TELEMETRY: NSR	    ECG: NSR.  	  RADIOLOGY:   ECHO:   STRESS: < from: NM Nuclear Stress Multiple (07.27.20 @ 15:00) >  Impression:  1. NORMAL STRESS AND REST MYOCARDIAL PERFUSION SPECT TOMOGRAPHY, WITH NO EVIDENCE FOR ISCHEMIA AT THE LEVEL OF EXERCISE ATTAINED.  2. NORMAL RESTING LEFT VENTRICULAR WALL MOTION AND WALL THICKENING.  3. LEFT VENTRICULAR EJECTION FRACTION OF 74 % WHICH IS WITHIN RANGE OF NORMAL.      < end of copied text >    CCTA: < from: CT Heart with Coronaries (11.05.18 @ 13:33) >  IMPRESSION:    1.  Moderate stenosis of proximal LAD, related to mixed plaque.  2.  Total coronary calcium score is 137.  For a 68 years old male , this   corresponds to 51 PAZ percentile rank.     CAD RAD: 3      < end of copied text >

## 2022-10-21 NOTE — DISCHARGE NOTE PROVIDER - PROVIDER TOKENS
PROVIDER:[TOKEN:[[id number]],FOLLOWUP:[1 week]],PROVIDER:[TOKEN:[[id number]],FOLLOWUP:[1 week]]

## 2022-10-27 ENCOUNTER — APPOINTMENT (OUTPATIENT)
Dept: CARDIOLOGY | Facility: CLINIC | Age: 72
End: 2022-10-27

## 2022-10-27 VITALS
HEART RATE: 69 BPM | HEIGHT: 67 IN | WEIGHT: 170 LBS | DIASTOLIC BLOOD PRESSURE: 86 MMHG | OXYGEN SATURATION: 99 % | SYSTOLIC BLOOD PRESSURE: 140 MMHG | BODY MASS INDEX: 26.68 KG/M2

## 2022-10-27 DIAGNOSIS — I20.8 OTHER FORMS OF ANGINA PECTORIS: ICD-10-CM

## 2022-10-27 DIAGNOSIS — S12.9XXA FRACTURE OF NECK, UNSPECIFIED, INITIAL ENCOUNTER: ICD-10-CM

## 2022-10-27 PROCEDURE — 93000 ELECTROCARDIOGRAM COMPLETE: CPT

## 2022-10-27 RX ORDER — ISOSORBIDE MONONITRATE 30 MG/1
30 TABLET, EXTENDED RELEASE ORAL
Qty: 90 | Refills: 0 | Status: DISCONTINUED | COMMUNITY
Start: 2022-10-17

## 2022-10-27 RX ORDER — CARVEDILOL 12.5 MG/1
12.5 TABLET, FILM COATED ORAL
Refills: 0 | Status: ACTIVE | COMMUNITY
Start: 2022-10-18

## 2022-10-27 RX ORDER — RANOLAZINE 500 MG/1
500 TABLET, EXTENDED RELEASE ORAL
Qty: 180 | Refills: 0 | Status: DISCONTINUED | COMMUNITY
Start: 2022-10-27 | End: 2022-10-27

## 2022-10-27 RX ORDER — ASPIRIN 81 MG/1
81 TABLET, CHEWABLE ORAL
Refills: 0 | Status: ACTIVE | COMMUNITY
Start: 2022-10-27

## 2022-10-28 NOTE — HISTORY OF PRESENT ILLNESS
[FreeTextEntry1] : Patient is a 72 yr-old male with hx of CAD, HTN, and HLD presenting with chest pain.  He had a CCTA in 2020 that showed moderate stenosis of his LAD.  Stress testing performed in 2020 and 2021 have been negative for myocardial ischemia.  He was recently admitted to [facility] last week for chest pain.  EKG and Mary were negative.  Isosorbide was increased to 60mg daily.\par \par Patient states his chest pain is intermittent and similar to the pain he had two years ago that prompted his initial cardiac workup.  He denies any shortness of breath, dizziness, palpitations, or leg swelling.  He does endorse shoulder pain that is likely from his chronic cervical compression.

## 2022-10-28 NOTE — CARDIOLOGY SUMMARY
[de-identified] : 10/27/22\par NSR @ 66 bpm.  No ST-T wave changes noted.   [de-identified] :  10/21/22\par  1. LV Ejection Fraction by Sparrow's Method with a biplane EF of 77 %.\par  2. Normal left atrial size.\par  3. There is no evidence of pericardial effusion.\par  4. Mild mitral valve regurgitation.\par  5. Structurally normal mitral valve, with normal leaflet excursion.\par  6. Structurally normal tricuspid valve, with normal leaflet excursion.\par  7. Normal trileaflet aortic valve with normal opening.\par

## 2022-10-28 NOTE — ASSESSMENT
[FreeTextEntry1] : #CAD\par #HTN\par #HLD\par #stable angina\par \par Plan:\par -Obtain stress echo to rule out myocardial ischemia.  Continue aspirin, carvedilol, isosorbide, and rosuvastatin.  If  patient has ischemic EKG changes or develops symptoms during stress test, will recommend cardiac   catheterization.  \par -HTN is well controlled.  Continue current medical therapy.  \par -Followup after testing.\par -Patient and spouse aware of plan.

## 2022-11-04 ENCOUNTER — APPOINTMENT (OUTPATIENT)
Dept: CARDIOLOGY | Facility: CLINIC | Age: 72
End: 2022-11-04

## 2022-11-04 PROCEDURE — 93320 DOPPLER ECHO COMPLETE: CPT

## 2022-11-04 PROCEDURE — 93351 STRESS TTE COMPLETE: CPT

## 2022-11-04 PROCEDURE — 99213 OFFICE O/P EST LOW 20 MIN: CPT

## 2022-11-04 PROCEDURE — 93325 DOPPLER ECHO COLOR FLOW MAPG: CPT

## 2022-11-04 RX ORDER — GABAPENTIN 100 MG/1
100 CAPSULE ORAL 3 TIMES DAILY
Qty: 270 | Refills: 0 | Status: ACTIVE | COMMUNITY
Start: 1900-01-01 | End: 1900-01-01

## 2022-11-07 NOTE — HISTORY OF PRESENT ILLNESS
[FreeTextEntry1] : Patient is a 72 yr-old male with hx of CAD, HTN, and HLD presenting for stress echo.  He was last seen in this office on 10/27/22 for hospital followup.  He was recently admitted to [facility] for chest pain.  EKG and Mary were negative at that time.  Isosorbide was increased to 60mg daily.  Patient had a CCTA in 2020 that showed moderate stenosis of his LAD.  Stress testing performed in 2020 and 2021 have been negative for myocardial ischemia.  \par \par Stress echo performed today revealed no evidence of exercise-induced myocardial ischemia at 85% MPHR.  Patient was hypertensive during the exam to 200/100s.  He states his SBPs at home range 140-160s.  BP post-testing was 149/90.

## 2022-11-07 NOTE — CARDIOLOGY SUMMARY
[de-identified] : 10/27/22\par NSR @ 66 bpm.  No ST-T wave changes noted.   [de-identified] :  10/21/22\par  1. LV Ejection Fraction by Sparrow's Method with a biplane EF of 77 %.\par  2. Normal left atrial size.\par  3. There is no evidence of pericardial effusion.\par  4. Mild mitral valve regurgitation.\par  5. Structurally normal mitral valve, with normal leaflet excursion.\par  6. Structurally normal tricuspid valve, with normal leaflet excursion.\par  7. Normal trileaflet aortic valve with normal opening.\par

## 2022-11-07 NOTE — ASSESSMENT
[FreeTextEntry1] : #CAD\par #HTN\par #HLD\par #stable angina\par \par Stress echo performed today revealed no evidence of exercise-induced myocardial ischemia at 85% MPHR.  Patient was hypertensive during the exam to 200/100s.  He states his SBPs at home range 140-160s.  BP post-testing was 149/90.\par \par Plan:\par -CAD is stable.  Continue aspirin, carvedilol, isosorbide, and rosuvastatin.  \par -BPs elevated.  Start amlodipine 2.5mg daily.  Patient and spouse to keep BP log at home and followup next week    by telephone.  Low salt diet discussed.  Increase in daily exercise encouraged.  \par -Patient and spouse aware of plan.

## 2022-11-17 DIAGNOSIS — F32.A DEPRESSION, UNSPECIFIED: ICD-10-CM

## 2022-11-17 DIAGNOSIS — Z79.82 LONG TERM (CURRENT) USE OF ASPIRIN: ICD-10-CM

## 2022-11-17 DIAGNOSIS — M25.512 PAIN IN LEFT SHOULDER: ICD-10-CM

## 2022-11-17 DIAGNOSIS — E78.00 PURE HYPERCHOLESTEROLEMIA, UNSPECIFIED: ICD-10-CM

## 2022-11-17 DIAGNOSIS — K21.9 GASTRO-ESOPHAGEAL REFLUX DISEASE WITHOUT ESOPHAGITIS: ICD-10-CM

## 2022-11-17 DIAGNOSIS — I10 ESSENTIAL (PRIMARY) HYPERTENSION: ICD-10-CM

## 2022-11-17 DIAGNOSIS — R07.89 OTHER CHEST PAIN: ICD-10-CM

## 2022-11-17 DIAGNOSIS — N40.0 BENIGN PROSTATIC HYPERPLASIA WITHOUT LOWER URINARY TRACT SYMPTOMS: ICD-10-CM

## 2022-12-19 ENCOUNTER — RX RENEWAL (OUTPATIENT)
Age: 72
End: 2022-12-19

## 2023-02-27 ENCOUNTER — RX RENEWAL (OUTPATIENT)
Age: 73
End: 2023-02-27

## 2023-03-03 ENCOUNTER — APPOINTMENT (OUTPATIENT)
Dept: CARDIOLOGY | Facility: CLINIC | Age: 73
End: 2023-03-03
Payer: MEDICARE

## 2023-03-03 VITALS
OXYGEN SATURATION: 99 % | HEART RATE: 72 BPM | RESPIRATION RATE: 16 BRPM | DIASTOLIC BLOOD PRESSURE: 76 MMHG | WEIGHT: 175 LBS | SYSTOLIC BLOOD PRESSURE: 139 MMHG | BODY MASS INDEX: 27.47 KG/M2 | HEIGHT: 67 IN

## 2023-03-03 PROCEDURE — 93000 ELECTROCARDIOGRAM COMPLETE: CPT

## 2023-03-03 PROCEDURE — 99214 OFFICE O/P EST MOD 30 MIN: CPT

## 2023-03-03 RX ORDER — OMEPRAZOLE 20 MG/1
20 CAPSULE, DELAYED RELEASE ORAL AS DIRECTED
Refills: 0 | Status: ACTIVE | COMMUNITY
Start: 2023-03-03

## 2023-03-03 RX ORDER — AMLODIPINE BESYLATE 2.5 MG/1
2.5 TABLET ORAL DAILY
Qty: 90 | Refills: 3 | Status: DISCONTINUED | COMMUNITY
Start: 2022-11-04 | End: 2023-03-03

## 2023-03-06 NOTE — HISTORY OF PRESENT ILLNESS
[FreeTextEntry1] : KAIDEN SAGE is a 72-year-old male, with a PMHx significant for CAD with moderate LAD stenosis, HTN, and HLD, who presents today for cardiac evaluation of chest pain. Patient indicates chest pain is reproducible with inspiration; states when he takes a deep breath he feels chest pain under his left rib. Denies any other symptoms. Recent stress test performed in 11/2022 was negative. Otherwise: (-) radiation, (-) diaphoresis, (-) dyspnea, (-) ripping or tearing quality, (-) positional component, (-) exertional component, (-) dizziness, (-) syncope, (-) nausea, (-) vomiting, (-) calf swelling/pain, (-) neuro deficits.\par

## 2023-05-24 ENCOUNTER — APPOINTMENT (OUTPATIENT)
Dept: CARDIOLOGY | Facility: CLINIC | Age: 73
End: 2023-05-24

## 2023-06-05 ENCOUNTER — RX RENEWAL (OUTPATIENT)
Age: 73
End: 2023-06-05

## 2023-06-05 RX ORDER — ROSUVASTATIN CALCIUM 20 MG/1
20 TABLET, FILM COATED ORAL
Qty: 90 | Refills: 2 | Status: ACTIVE | COMMUNITY
Start: 2022-05-11 | End: 1900-01-01

## 2023-08-02 ENCOUNTER — APPOINTMENT (OUTPATIENT)
Dept: ORTHOPEDIC SURGERY | Facility: CLINIC | Age: 73
End: 2023-08-02
Payer: MEDICARE

## 2023-08-02 DIAGNOSIS — M23.92 UNSPECIFIED INTERNAL DERANGEMENT OF LEFT KNEE: ICD-10-CM

## 2023-08-02 PROCEDURE — 99203 OFFICE O/P NEW LOW 30 MIN: CPT

## 2023-08-02 RX ORDER — ACETAMINOPHEN 500 MG/1
500 TABLET ORAL 3 TIMES DAILY
Qty: 120 | Refills: 0 | Status: ACTIVE | COMMUNITY
Start: 2023-08-02 | End: 1900-01-01

## 2023-08-11 ENCOUNTER — RX RENEWAL (OUTPATIENT)
Age: 73
End: 2023-08-11

## 2023-09-05 ENCOUNTER — APPOINTMENT (OUTPATIENT)
Dept: CARDIOLOGY | Facility: CLINIC | Age: 73
End: 2023-09-05

## 2023-09-11 ENCOUNTER — APPOINTMENT (OUTPATIENT)
Dept: ORTHOPEDIC SURGERY | Facility: CLINIC | Age: 73
End: 2023-09-11
Payer: MEDICARE

## 2023-09-11 DIAGNOSIS — M23.92 UNSPECIFIED INTERNAL DERANGEMENT OF LEFT KNEE: ICD-10-CM

## 2023-09-11 PROCEDURE — 99213 OFFICE O/P EST LOW 20 MIN: CPT

## 2023-09-20 ENCOUNTER — RX RENEWAL (OUTPATIENT)
Age: 73
End: 2023-09-20

## 2023-10-17 ENCOUNTER — APPOINTMENT (OUTPATIENT)
Dept: ORTHOPEDIC SURGERY | Facility: CLINIC | Age: 73
End: 2023-10-17
Payer: MEDICARE

## 2023-10-17 VITALS — WEIGHT: 170 LBS | HEIGHT: 68 IN | BODY MASS INDEX: 25.76 KG/M2

## 2023-10-17 DIAGNOSIS — M25.562 PAIN IN LEFT KNEE: ICD-10-CM

## 2023-10-17 PROCEDURE — 99214 OFFICE O/P EST MOD 30 MIN: CPT | Mod: 25

## 2023-10-17 PROCEDURE — 20611 DRAIN/INJ JOINT/BURSA W/US: CPT | Mod: LT

## 2023-10-18 ENCOUNTER — APPOINTMENT (OUTPATIENT)
Dept: CARDIOLOGY | Facility: CLINIC | Age: 73
End: 2023-10-18
Payer: MEDICARE

## 2023-10-18 VITALS
OXYGEN SATURATION: 99 % | HEIGHT: 68 IN | WEIGHT: 178 LBS | HEART RATE: 80 BPM | DIASTOLIC BLOOD PRESSURE: 65 MMHG | SYSTOLIC BLOOD PRESSURE: 132 MMHG | BODY MASS INDEX: 26.98 KG/M2

## 2023-10-18 PROCEDURE — 99214 OFFICE O/P EST MOD 30 MIN: CPT

## 2023-10-18 PROCEDURE — 93000 ELECTROCARDIOGRAM COMPLETE: CPT

## 2023-11-01 ENCOUNTER — NON-APPOINTMENT (OUTPATIENT)
Age: 73
End: 2023-11-01

## 2023-11-06 ENCOUNTER — RX RENEWAL (OUTPATIENT)
Age: 73
End: 2023-11-06

## 2023-11-06 RX ORDER — ISOSORBIDE MONONITRATE 60 MG/1
60 TABLET, EXTENDED RELEASE ORAL DAILY
Qty: 90 | Refills: 3 | Status: ACTIVE | COMMUNITY
Start: 2022-10-21 | End: 1900-01-01

## 2023-11-30 ENCOUNTER — APPOINTMENT (OUTPATIENT)
Dept: ORTHOPEDIC SURGERY | Facility: CLINIC | Age: 73
End: 2023-11-30

## 2023-12-20 ENCOUNTER — RX RENEWAL (OUTPATIENT)
Age: 73
End: 2023-12-20

## 2023-12-23 NOTE — H&P ADULT - ASSESSMENT
Assessment:        Plan:    #Chest pain r/o ACS.  Admit to inpatient level of care-telemetry  Cardiac monitoring  Troponin x 1 neg  Trend troponin  Repeat EKG In the AM.  Cardiology consult  Follow up labs/vital signs per unit.  Continue with Aspirin and BB ?  CCTA from 2018 negative.    #HTN Urgency  Patient with a hx of HTN.  S/p hydralazine 20mg IVP x 1.  ICU consult.  Continue to monitor blood pressure  Low sodium diet.  Continue with home medication.    #HLD  Continue with statin.     Above discussed with Dr. Ro.   Assessment:  Patient is a 72 y.o M with a pmhx of HTN, HLD who presents complaining of midsternal chest pain described as "crushing/stabbing" radiating to the left shoulder that began this afternoon while cooking    Plan:    #Chest pain r/o ACS.  Admit to inpatient level of care-telemetry  Cardiac monitoring  Troponin x 1 neg  Trend troponin  Repeat EKG In the AM.  Cardiology consult  Follow up labs/vital signs per unit.  Continue with Aspirin and BB.   Last CCTA was in 2020.   Nuclear stress test from 2020:   1. NORMAL STRESS AND REST MYOCARDIAL PERFUSION SPECT TOMOGRAPHY, WITH NO EVIDENCE FOR ISCHEMIA AT THE LEVEL OF EXERCISE ATTAINED.  2. NORMAL RESTING LEFT VENTRICULAR WALL MOTION AND WALL THICKENING.  3. LEFT VENTRICULAR EJECTION FRACTION OF 74 % WHICH IS WITHIN RANGE OF NORMAL.      #HTN Urgency  Patient with a hx of HTN.  S/p hydralazine 20mg IVP x 1.  Repeat /78.   ICU consult.  Continue to monitor blood pressure  Low sodium diet.  Continue with home medication.    #HLD  Continue with statin.     #BPH   Continue with flomax.    Above discussed with Dr. Ro.   Assessment:  Patient is a 72 y.o M with a pmhx of HTN, HLD who presents complaining of midsternal chest pain described as "crushing/stabbing" radiating to the left shoulder that began this afternoon while cooking    Plan:    #Chest pain r/o ACS.  Admit to inpatient level of care-telemetry  Cardiac monitoring  EKG shows NSR@73 BPM, no acute changes  Troponin x 1 neg  Trend troponin  Repeat EKG In the AM.  Cardiology consult  Follow up labs/vital signs per unit.  Continue with Aspirin and BB.   Last CCTA was in 2020.   Nuclear stress test from 2020:   1. NORMAL STRESS AND REST MYOCARDIAL PERFUSION SPECT TOMOGRAPHY, WITH NO EVIDENCE FOR ISCHEMIA AT THE LEVEL OF EXERCISE ATTAINED.  2. NORMAL RESTING LEFT VENTRICULAR WALL MOTION AND WALL THICKENING.  3. LEFT VENTRICULAR EJECTION FRACTION OF 74 % WHICH IS WITHIN RANGE OF NORMAL.      #HTN Urgency - resolved  Patient with a hx of HTN.  S/p hydralazine 20mg IVP x 1.  Repeat /78.   Continue to monitor blood pressure and titer BP meds  avoid over-correcting   Low sodium diet.  Continue with home medication.    #HLD  Continue with statin.     #BPH   Continue with flomax.    Above discussed with Dr. Ro.   4 = No assist / stand by assistance

## 2024-01-17 ENCOUNTER — RX RENEWAL (OUTPATIENT)
Age: 74
End: 2024-01-17

## 2024-01-17 RX ORDER — MELOXICAM 7.5 MG/1
7.5 TABLET ORAL
Qty: 30 | Refills: 1 | Status: ACTIVE | COMMUNITY
Start: 2023-08-02 | End: 1900-01-01

## 2024-02-02 ENCOUNTER — NON-APPOINTMENT (OUTPATIENT)
Age: 74
End: 2024-02-02

## 2024-02-02 RX ORDER — AMLODIPINE BESYLATE 5 MG/1
5 TABLET ORAL DAILY
Qty: 90 | Refills: 3 | Status: ACTIVE | COMMUNITY
Start: 2023-03-03 | End: 1900-01-01

## 2024-04-04 RX ORDER — LOSARTAN POTASSIUM 100 MG/1
100 TABLET, FILM COATED ORAL
Qty: 90 | Refills: 3 | Status: ACTIVE | COMMUNITY
Start: 2022-08-09 | End: 1900-01-01

## 2024-04-30 ENCOUNTER — APPOINTMENT (OUTPATIENT)
Dept: CARDIOLOGY | Facility: CLINIC | Age: 74
End: 2024-04-30
Payer: MEDICARE

## 2024-04-30 VITALS
SYSTOLIC BLOOD PRESSURE: 140 MMHG | WEIGHT: 170 LBS | BODY MASS INDEX: 25.76 KG/M2 | OXYGEN SATURATION: 96 % | DIASTOLIC BLOOD PRESSURE: 80 MMHG | HEIGHT: 68 IN | HEART RATE: 73 BPM

## 2024-04-30 DIAGNOSIS — I10 ESSENTIAL (PRIMARY) HYPERTENSION: ICD-10-CM

## 2024-04-30 DIAGNOSIS — I25.10 ATHEROSCLEROTIC HEART DISEASE OF NATIVE CORONARY ARTERY W/OUT ANGINA PECTORIS: ICD-10-CM

## 2024-04-30 DIAGNOSIS — E78.00 PURE HYPERCHOLESTEROLEMIA, UNSPECIFIED: ICD-10-CM

## 2024-04-30 PROCEDURE — 93000 ELECTROCARDIOGRAM COMPLETE: CPT

## 2024-04-30 PROCEDURE — 99214 OFFICE O/P EST MOD 30 MIN: CPT

## 2024-04-30 PROCEDURE — G2211 COMPLEX E/M VISIT ADD ON: CPT

## 2024-05-02 PROBLEM — I25.10 CAD (CORONARY ARTERY DISEASE): Status: ACTIVE | Noted: 2022-10-27

## 2024-05-02 PROBLEM — I10 HYPERTENSION: Status: ACTIVE | Noted: 2018-05-02

## 2024-05-02 PROBLEM — E78.00 HIGH CHOLESTEROL: Status: ACTIVE | Noted: 2018-05-02

## 2024-05-06 NOTE — DISCUSSION/SUMMARY
[EKG obtained to assist in diagnosis and management of assessed problem(s)] : EKG obtained to assist in diagnosis and management of assessed problem(s) [FreeTextEntry1] : EKG performed today was unremarkable.  CAD: The impression is coronary artery disease. Currently, the condition is stable. There are no changes in medication management. Continue current medical therapy. Other planned treatment includes dietary modification, an exercise regimen, and weight reduction.  HTN: The impression is hypertension. Currently, the condition is controlled. There are no changes in medication management. Continue current medical therapy. Other planned treatments include an exercise regimen, weight loss, low sodium diet, and alcohol moderation.  HLD: The impression is hyperlipidemia. Currently, the condition is stable. There are no changes in medication management. Continue current medical therapy. Other planned treatment includes diet modification, exercise, and weight loss.  Lab requisition provided to check A1CG, CMP, Lipid Profile, TSH, and UA.  Instructed to follow up in 6 months.  Plan was discussed with the patient.

## 2024-05-06 NOTE — HISTORY OF PRESENT ILLNESS
[FreeTextEntry1] : KAIDEN SAGE is a 73-year-old male, with a PMHx significant for CAD with moderate LAD stenosis, HTN, and HLD, who presents today for a follow-up visit. Patient has known hx of radiculopathy and states that it sometimes exacerbates left arm/shoulder pain. Indicates he otherwise exercises daily without complaints. Denies any other complaints at this time. Otherwise: (-) chest pain, (-) SOB.

## 2024-08-16 ENCOUNTER — NON-APPOINTMENT (OUTPATIENT)
Age: 74
End: 2024-08-16

## 2024-08-28 NOTE — PATIENT PROFILE ADULT - NSPROPTRIGHTNOTIFY_GEN_A_NUR
Quality 226: Preventive Care And Screening: Tobacco Use: Screening And Cessation Intervention: Patient screened for tobacco use and is an ex/non-smoker
Quality 130: Documentation Of Current Medications In The Medical Record: Current Medications Documented
Detail Level: Detailed
declines

## 2024-09-20 ENCOUNTER — OUTPATIENT (OUTPATIENT)
Dept: OUTPATIENT SERVICES | Facility: HOSPITAL | Age: 74
LOS: 1 days | Discharge: HOME | End: 2024-09-20
Payer: MEDICARE

## 2024-09-20 ENCOUNTER — APPOINTMENT (OUTPATIENT)
Dept: SLEEP CENTER | Facility: HOSPITAL | Age: 74
End: 2024-09-20

## 2024-09-20 DIAGNOSIS — G47.33 OBSTRUCTIVE SLEEP APNEA (ADULT) (PEDIATRIC): ICD-10-CM

## 2024-09-20 PROCEDURE — 95806 SLEEP STUDY UNATT&RESP EFFT: CPT

## 2024-09-20 PROCEDURE — 95800 SLP STDY UNATTENDED: CPT | Mod: 26

## 2024-09-24 DIAGNOSIS — G47.33 OBSTRUCTIVE SLEEP APNEA (ADULT) (PEDIATRIC): ICD-10-CM

## 2024-10-29 ENCOUNTER — NON-APPOINTMENT (OUTPATIENT)
Age: 74
End: 2024-10-29

## 2024-10-29 ENCOUNTER — APPOINTMENT (OUTPATIENT)
Dept: CARDIOLOGY | Facility: CLINIC | Age: 74
End: 2024-10-29
Payer: MEDICARE

## 2024-10-29 VITALS
WEIGHT: 174 LBS | OXYGEN SATURATION: 96 % | HEART RATE: 72 BPM | HEIGHT: 68 IN | RESPIRATION RATE: 16 BRPM | DIASTOLIC BLOOD PRESSURE: 84 MMHG | BODY MASS INDEX: 26.37 KG/M2 | SYSTOLIC BLOOD PRESSURE: 134 MMHG

## 2024-10-29 DIAGNOSIS — E78.00 PURE HYPERCHOLESTEROLEMIA, UNSPECIFIED: ICD-10-CM

## 2024-10-29 DIAGNOSIS — I10 ESSENTIAL (PRIMARY) HYPERTENSION: ICD-10-CM

## 2024-10-29 DIAGNOSIS — I25.10 ATHEROSCLEROTIC HEART DISEASE OF NATIVE CORONARY ARTERY W/OUT ANGINA PECTORIS: ICD-10-CM

## 2024-10-29 DIAGNOSIS — R07.9 CHEST PAIN, UNSPECIFIED: ICD-10-CM

## 2024-10-29 DIAGNOSIS — R06.02 SHORTNESS OF BREATH: ICD-10-CM

## 2024-10-29 PROCEDURE — 93000 ELECTROCARDIOGRAM COMPLETE: CPT

## 2024-10-29 PROCEDURE — G2211 COMPLEX E/M VISIT ADD ON: CPT

## 2024-10-29 PROCEDURE — 99214 OFFICE O/P EST MOD 30 MIN: CPT

## 2024-10-29 RX ORDER — SUCRALFATE 1 G/1
1 TABLET ORAL
Refills: 0 | Status: ACTIVE | COMMUNITY
Start: 2024-10-29

## 2024-12-02 ENCOUNTER — APPOINTMENT (OUTPATIENT)
Dept: CARDIOLOGY | Facility: CLINIC | Age: 74
End: 2024-12-02

## 2024-12-02 ENCOUNTER — APPOINTMENT (OUTPATIENT)
Dept: CARDIOLOGY | Facility: CLINIC | Age: 74
End: 2024-12-02
Payer: MEDICARE

## 2024-12-02 DIAGNOSIS — I25.10 ATHEROSCLEROTIC HEART DISEASE OF NATIVE CORONARY ARTERY W/OUT ANGINA PECTORIS: ICD-10-CM

## 2024-12-02 DIAGNOSIS — R06.02 SHORTNESS OF BREATH: ICD-10-CM

## 2024-12-02 DIAGNOSIS — E78.00 PURE HYPERCHOLESTEROLEMIA, UNSPECIFIED: ICD-10-CM

## 2024-12-02 DIAGNOSIS — R07.9 CHEST PAIN, UNSPECIFIED: ICD-10-CM

## 2024-12-02 DIAGNOSIS — I10 ESSENTIAL (PRIMARY) HYPERTENSION: ICD-10-CM

## 2024-12-02 PROCEDURE — 99214 OFFICE O/P EST MOD 30 MIN: CPT

## 2024-12-02 PROCEDURE — 93306 TTE W/DOPPLER COMPLETE: CPT | Mod: 59

## 2024-12-02 PROCEDURE — 93351 STRESS TTE COMPLETE: CPT

## 2025-01-07 ENCOUNTER — RX RENEWAL (OUTPATIENT)
Age: 75
End: 2025-01-07

## 2025-01-09 NOTE — PATIENT PROFILE ADULT - NSPROGENPREVTRANSF_GEN_A_NUR
PAST SURGICAL HISTORY:  History of Appendectomy     Knee Replacement right    Pyloric Stenosis Repair     Revision of right Knee replacement     S/P Knee Replacement Left     no

## 2025-06-02 ENCOUNTER — APPOINTMENT (OUTPATIENT)
Dept: CARDIOLOGY | Facility: CLINIC | Age: 75
End: 2025-06-02

## 2025-06-03 ENCOUNTER — APPOINTMENT (OUTPATIENT)
Dept: CARDIOLOGY | Facility: CLINIC | Age: 75
End: 2025-06-03
Payer: MEDICARE

## 2025-06-03 VITALS
HEART RATE: 77 BPM | DIASTOLIC BLOOD PRESSURE: 75 MMHG | SYSTOLIC BLOOD PRESSURE: 147 MMHG | WEIGHT: 172 LBS | BODY MASS INDEX: 26.07 KG/M2 | OXYGEN SATURATION: 95 % | HEIGHT: 68 IN

## 2025-06-03 DIAGNOSIS — G47.30 SLEEP APNEA, UNSPECIFIED: ICD-10-CM

## 2025-06-03 DIAGNOSIS — I10 ESSENTIAL (PRIMARY) HYPERTENSION: ICD-10-CM

## 2025-06-03 DIAGNOSIS — I25.10 ATHEROSCLEROTIC HEART DISEASE OF NATIVE CORONARY ARTERY W/OUT ANGINA PECTORIS: ICD-10-CM

## 2025-06-03 DIAGNOSIS — E78.00 PURE HYPERCHOLESTEROLEMIA, UNSPECIFIED: ICD-10-CM

## 2025-06-03 PROCEDURE — 93000 ELECTROCARDIOGRAM COMPLETE: CPT

## 2025-06-03 PROCEDURE — G2211 COMPLEX E/M VISIT ADD ON: CPT

## 2025-06-03 PROCEDURE — 99214 OFFICE O/P EST MOD 30 MIN: CPT

## 2025-06-03 RX ORDER — HYDROCHLOROTHIAZIDE 12.5 MG/1
12.5 CAPSULE ORAL TWICE DAILY
Qty: 60 | Refills: 5 | Status: ACTIVE | COMMUNITY
Start: 2025-06-03 | End: 1900-01-01

## 2025-07-02 ENCOUNTER — EMERGENCY (EMERGENCY)
Facility: HOSPITAL | Age: 75
LOS: 0 days | Discharge: HOME | End: 2025-07-02
Attending: STUDENT IN AN ORGANIZED HEALTH CARE EDUCATION/TRAINING PROGRAM
Payer: MEDICARE

## 2025-07-02 VITALS
DIASTOLIC BLOOD PRESSURE: 77 MMHG | RESPIRATION RATE: 20 BRPM | SYSTOLIC BLOOD PRESSURE: 143 MMHG | HEIGHT: 68 IN | OXYGEN SATURATION: 96 % | HEART RATE: 65 BPM | WEIGHT: 169.98 LBS | TEMPERATURE: 98 F

## 2025-07-02 VITALS — SYSTOLIC BLOOD PRESSURE: 123 MMHG | OXYGEN SATURATION: 98 % | DIASTOLIC BLOOD PRESSURE: 59 MMHG | HEART RATE: 71 BPM

## 2025-07-02 DIAGNOSIS — E87.1 HYPO-OSMOLALITY AND HYPONATREMIA: ICD-10-CM

## 2025-07-02 DIAGNOSIS — E78.5 HYPERLIPIDEMIA, UNSPECIFIED: ICD-10-CM

## 2025-07-02 DIAGNOSIS — E11.9 TYPE 2 DIABETES MELLITUS WITHOUT COMPLICATIONS: ICD-10-CM

## 2025-07-02 DIAGNOSIS — I10 ESSENTIAL (PRIMARY) HYPERTENSION: ICD-10-CM

## 2025-07-02 LAB
ALBUMIN SERPL ELPH-MCNC: 4.8 G/DL — SIGNIFICANT CHANGE UP (ref 3.5–5.2)
ALP SERPL-CCNC: 77 U/L — SIGNIFICANT CHANGE UP (ref 30–115)
ALT FLD-CCNC: 43 U/L — HIGH (ref 0–41)
ANION GAP SERPL CALC-SCNC: 14 MMOL/L — SIGNIFICANT CHANGE UP (ref 7–14)
AST SERPL-CCNC: 31 U/L — SIGNIFICANT CHANGE UP (ref 0–41)
BASOPHILS # BLD AUTO: 0.07 K/UL — SIGNIFICANT CHANGE UP (ref 0–0.2)
BASOPHILS NFR BLD AUTO: 1.1 % — SIGNIFICANT CHANGE UP (ref 0–2)
BILIRUB SERPL-MCNC: 0.8 MG/DL — SIGNIFICANT CHANGE UP (ref 0.2–1.2)
BUN SERPL-MCNC: 16 MG/DL — SIGNIFICANT CHANGE UP (ref 10–20)
CALCIUM SERPL-MCNC: 9.5 MG/DL — SIGNIFICANT CHANGE UP (ref 8.4–10.5)
CHLORIDE SERPL-SCNC: 80 MMOL/L — LOW (ref 98–110)
CO2 SERPL-SCNC: 29 MMOL/L — SIGNIFICANT CHANGE UP (ref 17–32)
CREAT SERPL-MCNC: 1.1 MG/DL — SIGNIFICANT CHANGE UP (ref 0.7–1.5)
EGFR: 70 ML/MIN/1.73M2 — SIGNIFICANT CHANGE UP
EGFR: 70 ML/MIN/1.73M2 — SIGNIFICANT CHANGE UP
EOSINOPHIL # BLD AUTO: 0.15 K/UL — SIGNIFICANT CHANGE UP (ref 0–0.5)
EOSINOPHIL NFR BLD AUTO: 2.5 % — SIGNIFICANT CHANGE UP (ref 0–6)
GLUCOSE SERPL-MCNC: 159 MG/DL — HIGH (ref 70–99)
HCT VFR BLD CALC: 41.8 % — SIGNIFICANT CHANGE UP (ref 39–50)
HGB BLD-MCNC: 13.5 G/DL — SIGNIFICANT CHANGE UP (ref 13–17)
IMM GRANULOCYTES # BLD AUTO: 0.02 K/UL — SIGNIFICANT CHANGE UP (ref 0–0.07)
IMM GRANULOCYTES NFR BLD AUTO: 0.3 % — SIGNIFICANT CHANGE UP (ref 0–0.9)
LIDOCAIN IGE QN: 32 U/L — SIGNIFICANT CHANGE UP (ref 7–60)
LYMPHOCYTES # BLD AUTO: 1.3 K/UL — SIGNIFICANT CHANGE UP (ref 1–3.3)
LYMPHOCYTES NFR BLD AUTO: 21.3 % — SIGNIFICANT CHANGE UP (ref 13–44)
MAGNESIUM SERPL-MCNC: 2.1 MG/DL — SIGNIFICANT CHANGE UP (ref 1.8–2.4)
MCHC RBC-ENTMCNC: 22.8 PG — LOW (ref 27–34)
MCHC RBC-ENTMCNC: 32.3 G/DL — SIGNIFICANT CHANGE UP (ref 32–36)
MCV RBC AUTO: 70.6 FL — LOW (ref 80–100)
MONOCYTES # BLD AUTO: 0.59 K/UL — SIGNIFICANT CHANGE UP (ref 0–0.9)
MONOCYTES NFR BLD AUTO: 9.7 % — SIGNIFICANT CHANGE UP (ref 2–14)
NEUTROPHILS # BLD AUTO: 3.96 K/UL — SIGNIFICANT CHANGE UP (ref 1.8–7.4)
NEUTROPHILS NFR BLD AUTO: 65.1 % — SIGNIFICANT CHANGE UP (ref 43–77)
NRBC # BLD AUTO: 0 K/UL — SIGNIFICANT CHANGE UP (ref 0–0)
NRBC # FLD: 0 K/UL — SIGNIFICANT CHANGE UP (ref 0–0)
NRBC BLD AUTO-RTO: 0 /100 WBCS — SIGNIFICANT CHANGE UP (ref 0–0)
PLATELET # BLD AUTO: 195 K/UL — SIGNIFICANT CHANGE UP (ref 150–400)
PMV BLD: 9.7 FL — SIGNIFICANT CHANGE UP (ref 7–13)
POTASSIUM SERPL-MCNC: 3.2 MMOL/L — LOW (ref 3.5–5)
POTASSIUM SERPL-SCNC: 3.2 MMOL/L — LOW (ref 3.5–5)
PROT SERPL-MCNC: 7.6 G/DL — SIGNIFICANT CHANGE UP (ref 6–8)
RBC # BLD: 5.92 M/UL — HIGH (ref 4.2–5.8)
RBC # FLD: 14.1 % — SIGNIFICANT CHANGE UP (ref 10.3–14.5)
SODIUM SERPL-SCNC: 123 MMOL/L — LOW (ref 135–146)
TROPONIN T, HIGH SENSITIVITY RESULT: 6 NG/L — SIGNIFICANT CHANGE UP (ref 6–21)
TROPONIN T, HIGH SENSITIVITY RESULT: <6 NG/L — SIGNIFICANT CHANGE UP (ref 6–21)
WBC # BLD: 6.09 K/UL — SIGNIFICANT CHANGE UP (ref 3.8–10.5)
WBC # FLD AUTO: 6.09 K/UL — SIGNIFICANT CHANGE UP (ref 3.8–10.5)

## 2025-07-02 PROCEDURE — 82962 GLUCOSE BLOOD TEST: CPT

## 2025-07-02 PROCEDURE — 99285 EMERGENCY DEPT VISIT HI MDM: CPT | Mod: 25

## 2025-07-02 PROCEDURE — 93005 ELECTROCARDIOGRAM TRACING: CPT

## 2025-07-02 PROCEDURE — 80053 COMPREHEN METABOLIC PANEL: CPT

## 2025-07-02 PROCEDURE — 85025 COMPLETE CBC W/AUTO DIFF WBC: CPT

## 2025-07-02 PROCEDURE — 93010 ELECTROCARDIOGRAM REPORT: CPT | Mod: 76

## 2025-07-02 PROCEDURE — 83735 ASSAY OF MAGNESIUM: CPT

## 2025-07-02 PROCEDURE — 99285 EMERGENCY DEPT VISIT HI MDM: CPT | Mod: FS

## 2025-07-02 PROCEDURE — 83690 ASSAY OF LIPASE: CPT

## 2025-07-02 PROCEDURE — 71046 X-RAY EXAM CHEST 2 VIEWS: CPT | Mod: 26

## 2025-07-02 PROCEDURE — 96374 THER/PROPH/DIAG INJ IV PUSH: CPT

## 2025-07-02 PROCEDURE — 96375 TX/PRO/DX INJ NEW DRUG ADDON: CPT

## 2025-07-02 PROCEDURE — 71046 X-RAY EXAM CHEST 2 VIEWS: CPT

## 2025-07-02 PROCEDURE — 84484 ASSAY OF TROPONIN QUANT: CPT

## 2025-07-02 PROCEDURE — 36415 COLL VENOUS BLD VENIPUNCTURE: CPT

## 2025-07-02 RX ADMIN — Medication 4 MILLIGRAM(S): at 11:09

## 2025-07-02 RX ADMIN — Medication 50 MILLIEQUIVALENT(S): at 11:57

## 2025-07-02 NOTE — ED PROVIDER NOTE - PATIENT PORTAL LINK FT
You can access the FollowMyHealth Patient Portal offered by [facility] by registering at the following website: [email or web address]. By joining [employer]’s FollowMyHealth portal, you will also be able to view your health information using other applications (apps) compatible with our system.

## 2025-07-02 NOTE — ED PROVIDER NOTE - CARE PROVIDER_API CALL
Pawan York  Interventional Cardiology  [address] [place] [address]  Phone: [phone]  Fax: [phone]  Follow Up Time:

## 2025-07-02 NOTE — ED PROVIDER NOTE - PROGRESS NOTE DETAILS
spoke with pt's cardiologist who will make appt for pt next week. Instructed to hold HCTZ until then for hyponatremia

## 2025-07-02 NOTE — ED PROVIDER NOTE - OBJECTIVE STATEMENT
Pt is a 73y/o male with a pmhx htn, hld, diabetes here for substernal chest pain x 5 days with associated belching. Pt denies fever, chills, cough, n/v/d, SOB, leg swelling

## 2025-07-02 NOTE — ED PROVIDER NOTE - CONSIDERATION OF ADMISSION OBSERVATION
Consideration of Admission/Observation considered, but pt's cardiologist Dr. York can see pt next week and will call pt on his cellphone to ensure follow up and will obtain appropriate either stress test or CCTA outpt --per discussion by MIC Howell with him.

## 2025-07-02 NOTE — ED PROVIDER NOTE - CLINICAL SUMMARY MEDICAL DECISION MAKING FREE TEXT BOX
Pt w/ hx of HTN, HLD, DM p/w substernal CP, occasionally radiates to the L arm but nonexertional, constant, nonpleuritic, nonpositional. Has had intmiertetny x5 days, but prior to this has has similar but less severe episodes also under similar circumstances without exertion. Exam without evidence of volume overload so doubt heart failure. EKG without signs of active ischemia. Given the timing of pain to ER presentation, delta troponin was so doubt NSTEMI. Presentation not consistent with acute PE (no tachypnea or tachycardia),pneumothorax (not visualized on chest xr), thoracic aortic dissection, pericarditis, tamponade, pneumonia (no infectious symptoms, clear chest xr), myocarditis (no recent illness, neg trop). HEART score: 5. Per our dept protocol, can admit or follow up outpatient. Pt's cardiologist can see him nexts week. Pt agreeable with plan.

## 2025-07-02 NOTE — ED ADULT NURSE NOTE - ISOLATION TYPE:
Diff to breathe x Sunday
PET scan was denied.  Contacted pt and explained this, with plan to continue with pt's scheduled CT and MD visits in April.  Pt. verbalizes understanding, agreement.  
Patient was here for his PET and it shows that the insurance denied.  Patient would like to speak to someone about what is going on with the PET  
None

## 2025-07-06 ENCOUNTER — EMERGENCY (EMERGENCY)
Facility: HOSPITAL | Age: 75
LOS: 0 days | Discharge: HOME | End: 2025-07-06
Attending: EMERGENCY MEDICINE
Payer: MEDICARE

## 2025-07-06 VITALS
OXYGEN SATURATION: 97 % | RESPIRATION RATE: 18 BRPM | TEMPERATURE: 98 F | HEIGHT: 68 IN | SYSTOLIC BLOOD PRESSURE: 138 MMHG | DIASTOLIC BLOOD PRESSURE: 67 MMHG | WEIGHT: 169.98 LBS | HEART RATE: 69 BPM

## 2025-07-06 DIAGNOSIS — R42 DIZZINESS AND GIDDINESS: ICD-10-CM

## 2025-07-06 DIAGNOSIS — K21.9 GASTRO-ESOPHAGEAL REFLUX DISEASE WITHOUT ESOPHAGITIS: ICD-10-CM

## 2025-07-06 DIAGNOSIS — R94.31 ABNORMAL ELECTROCARDIOGRAM [ECG] [EKG]: ICD-10-CM

## 2025-07-06 DIAGNOSIS — E78.00 PURE HYPERCHOLESTEROLEMIA, UNSPECIFIED: ICD-10-CM

## 2025-07-06 DIAGNOSIS — I10 ESSENTIAL (PRIMARY) HYPERTENSION: ICD-10-CM

## 2025-07-06 DIAGNOSIS — Z87.891 PERSONAL HISTORY OF NICOTINE DEPENDENCE: ICD-10-CM

## 2025-07-06 DIAGNOSIS — R11.0 NAUSEA: ICD-10-CM

## 2025-07-06 DIAGNOSIS — I49.9 CARDIAC ARRHYTHMIA, UNSPECIFIED: ICD-10-CM

## 2025-07-06 LAB
ALBUMIN SERPL ELPH-MCNC: 4.4 G/DL — SIGNIFICANT CHANGE UP (ref 3.5–5.2)
ALP SERPL-CCNC: 108 U/L — SIGNIFICANT CHANGE UP (ref 30–115)
ALT FLD-CCNC: 40 U/L — SIGNIFICANT CHANGE UP (ref 0–41)
ANION GAP SERPL CALC-SCNC: 15 MMOL/L — HIGH (ref 7–14)
AST SERPL-CCNC: 32 U/L — SIGNIFICANT CHANGE UP (ref 0–41)
BASOPHILS # BLD AUTO: 0.08 K/UL — SIGNIFICANT CHANGE UP (ref 0–0.2)
BASOPHILS NFR BLD AUTO: 1.3 % — SIGNIFICANT CHANGE UP (ref 0–2)
BILIRUB SERPL-MCNC: 0.4 MG/DL — SIGNIFICANT CHANGE UP (ref 0.2–1.2)
BUN SERPL-MCNC: 19 MG/DL — SIGNIFICANT CHANGE UP (ref 10–20)
CALCIUM SERPL-MCNC: 8.9 MG/DL — SIGNIFICANT CHANGE UP (ref 8.4–10.5)
CHLORIDE SERPL-SCNC: 94 MMOL/L — LOW (ref 98–110)
CO2 SERPL-SCNC: 26 MMOL/L — SIGNIFICANT CHANGE UP (ref 17–32)
CREAT SERPL-MCNC: 1.1 MG/DL — SIGNIFICANT CHANGE UP (ref 0.7–1.5)
EGFR: 70 ML/MIN/1.73M2 — SIGNIFICANT CHANGE UP
EGFR: 70 ML/MIN/1.73M2 — SIGNIFICANT CHANGE UP
EOSINOPHIL # BLD AUTO: 0.23 K/UL — SIGNIFICANT CHANGE UP (ref 0–0.5)
EOSINOPHIL NFR BLD AUTO: 3.8 % — SIGNIFICANT CHANGE UP (ref 0–6)
GLUCOSE SERPL-MCNC: 185 MG/DL — HIGH (ref 70–99)
HCT VFR BLD CALC: 38.9 % — LOW (ref 39–50)
HGB BLD-MCNC: 12.3 G/DL — LOW (ref 13–17)
IMM GRANULOCYTES # BLD AUTO: 0.01 K/UL — SIGNIFICANT CHANGE UP (ref 0–0.07)
IMM GRANULOCYTES NFR BLD AUTO: 0.2 % — SIGNIFICANT CHANGE UP (ref 0–0.9)
LYMPHOCYTES # BLD AUTO: 1.69 K/UL — SIGNIFICANT CHANGE UP (ref 1–3.3)
LYMPHOCYTES NFR BLD AUTO: 28.3 % — SIGNIFICANT CHANGE UP (ref 13–44)
MAGNESIUM SERPL-MCNC: 2.1 MG/DL — SIGNIFICANT CHANGE UP (ref 1.8–2.4)
MCHC RBC-ENTMCNC: 23 PG — LOW (ref 27–34)
MCHC RBC-ENTMCNC: 31.6 G/DL — LOW (ref 32–36)
MCV RBC AUTO: 72.7 FL — LOW (ref 80–100)
MONOCYTES # BLD AUTO: 0.51 K/UL — SIGNIFICANT CHANGE UP (ref 0–0.9)
MONOCYTES NFR BLD AUTO: 8.5 % — SIGNIFICANT CHANGE UP (ref 2–14)
NEUTROPHILS # BLD AUTO: 3.46 K/UL — SIGNIFICANT CHANGE UP (ref 1.8–7.4)
NEUTROPHILS NFR BLD AUTO: 57.9 % — SIGNIFICANT CHANGE UP (ref 43–77)
NRBC # BLD AUTO: 0 K/UL — SIGNIFICANT CHANGE UP (ref 0–0)
NRBC # FLD: 0 K/UL — SIGNIFICANT CHANGE UP (ref 0–0)
NRBC BLD AUTO-RTO: 0 /100 WBCS — SIGNIFICANT CHANGE UP (ref 0–0)
PLATELET # BLD AUTO: 186 K/UL — SIGNIFICANT CHANGE UP (ref 150–400)
PMV BLD: 9.1 FL — SIGNIFICANT CHANGE UP (ref 7–13)
POTASSIUM SERPL-MCNC: 3.7 MMOL/L — SIGNIFICANT CHANGE UP (ref 3.5–5)
POTASSIUM SERPL-SCNC: 3.7 MMOL/L — SIGNIFICANT CHANGE UP (ref 3.5–5)
PROT SERPL-MCNC: 6.9 G/DL — SIGNIFICANT CHANGE UP (ref 6–8)
RBC # BLD: 5.35 M/UL — SIGNIFICANT CHANGE UP (ref 4.2–5.8)
RBC # FLD: 14.8 % — HIGH (ref 10.3–14.5)
SODIUM SERPL-SCNC: 135 MMOL/L — SIGNIFICANT CHANGE UP (ref 135–146)
WBC # BLD: 5.98 K/UL — SIGNIFICANT CHANGE UP (ref 3.8–10.5)
WBC # FLD AUTO: 5.98 K/UL — SIGNIFICANT CHANGE UP (ref 3.8–10.5)

## 2025-07-06 PROCEDURE — 93005 ELECTROCARDIOGRAM TRACING: CPT

## 2025-07-06 PROCEDURE — 83735 ASSAY OF MAGNESIUM: CPT

## 2025-07-06 PROCEDURE — 71046 X-RAY EXAM CHEST 2 VIEWS: CPT | Mod: 26

## 2025-07-06 PROCEDURE — 93010 ELECTROCARDIOGRAM REPORT: CPT

## 2025-07-06 PROCEDURE — 99285 EMERGENCY DEPT VISIT HI MDM: CPT | Mod: FS

## 2025-07-06 PROCEDURE — 70450 CT HEAD/BRAIN W/O DYE: CPT

## 2025-07-06 PROCEDURE — 36415 COLL VENOUS BLD VENIPUNCTURE: CPT

## 2025-07-06 PROCEDURE — 80053 COMPREHEN METABOLIC PANEL: CPT

## 2025-07-06 PROCEDURE — 71046 X-RAY EXAM CHEST 2 VIEWS: CPT

## 2025-07-06 PROCEDURE — 70450 CT HEAD/BRAIN W/O DYE: CPT | Mod: 26

## 2025-07-06 PROCEDURE — 85025 COMPLETE CBC W/AUTO DIFF WBC: CPT

## 2025-07-06 PROCEDURE — 99285 EMERGENCY DEPT VISIT HI MDM: CPT | Mod: 25

## 2025-07-06 RX ORDER — MECLIZINE HCL 12.5 MG
25 TABLET ORAL ONCE
Refills: 0 | Status: COMPLETED | OUTPATIENT
Start: 2025-07-06 | End: 2025-07-06

## 2025-07-06 RX ORDER — MECLIZINE HCL 12.5 MG
1 TABLET ORAL
Qty: 21 | Refills: 0
Start: 2025-07-06 | End: 2025-07-12

## 2025-07-06 RX ADMIN — Medication 25 MILLIGRAM(S): at 20:41

## 2025-07-06 NOTE — ED PROVIDER NOTE - OBJECTIVE STATEMENT
73 y/o male with hx of HTN, GERD, hyperchol presents to the Ed for evaluation of dizziness and nausea without any headaches. patient eval a few days ago for similar symptoms dx with hyponatremia and hypokalemia. patient denies any recent illness. no visual changes, tinnitus. no weakness or tingling to extremities. no chest pain , palpitations or sob. no recent travel

## 2025-07-06 NOTE — ED PROVIDER NOTE - CLINICAL SUMMARY MEDICAL DECISION MAKING FREE TEXT BOX
Patient is a 74-year-old male presents for evaluation of dizziness past medical history of hypertension hypercholesterolemia states that he had similar symptoms 4 days prior prompting visit to the emergency department diagnosed with hyponatremia as well as hypokalemia and replaced in the emergency department patient improved patient had persistent symptoms prompting visit here to the emergency department denies any headache visual changes neck pain chest pain shortness of breath abdominal pain back pain fevers chills or vomiting    On physical exam patient is normocephalic atraumatic pupils equally round reactive light accommodation extraocular muscles intact oropharynx clear chest clear to auscultation bilaterally abdomen soft nontender nondistended bowel sounds positive no guarding or rebound extremities full range of motion no focal deficits noted    Assessment plan patient presents to the emergency department for evaluation of dizziness onset of symptoms at least 4 days patient has no complaints of headache neck pain chest pain diaphoresis shortness of breath we obtain EKG per my independent evaluation not consistent with STEMI and axis and QT prolongation axis with arrhythmia given time course patient is not a candidate for emergent TNK therapy in addition not consistent with LVO given time course not a candidate for neurointervention at this time we obtained CT head which is negative we obtain labs patient has no evidence of hyponatremia or hypokalemia patient was reevaluated given p.o. meclizine improved here in the emergency department currently asymptomatic able to ambulate well I will discharge follow-up to PMD next 24 to 48 hours

## 2025-07-06 NOTE — ED PROVIDER NOTE - ATTENDING APP SHARED VISIT CONTRIBUTION OF CARE
I have personally performed a history and physical exam on this patient and personally directed the management of the patient. Patient is a 74-year-old male presents for evaluation of dizziness past medical history of hypertension hypercholesterolemia states that he had similar symptoms 4 days prior prompting visit to the emergency department diagnosed with hyponatremia as well as hypokalemia and replaced in the emergency department patient improved patient had persistent symptoms prompting visit here to the emergency department denies any headache visual changes neck pain chest pain shortness of breath abdominal pain back pain fevers chills or vomiting    On physical exam patient is normocephalic atraumatic pupils equally round reactive light accommodation extraocular muscles intact oropharynx clear chest clear to auscultation bilaterally abdomen soft nontender nondistended bowel sounds positive no guarding or rebound extremities full range of motion no focal deficits noted    Assessment plan patient presents to the emergency department for evaluation of dizziness onset of symptoms at least 4 days patient has no complaints of headache neck pain chest pain diaphoresis shortness of breath we obtain EKG per my independent evaluation not consistent with STEMI and axis and QT prolongation axis with arrhythmia given time course patient is not a candidate for emergent TNK therapy in addition not consistent with LVO given time course not a candidate for neurointervention at this time we obtained CT head which is negative we obtain labs patient has no evidence of hyponatremia or hypokalemia patient was reevaluated given p.o. meclizine improved here in the emergency department currently asymptomatic able to ambulate well I will discharge follow-up to PMD next 24 to 48 hours

## 2025-07-06 NOTE — ED PROVIDER NOTE - PATIENT PORTAL LINK FT
You can access the FollowMyHealth Patient Portal offered by [facility] by registering at the following website: [email or web address]. By joining [employer]’s FollowMyHealth portal, you will also be able to view your health information using other applications (apps) compatible with our system.

## 2025-07-09 ENCOUNTER — APPOINTMENT (OUTPATIENT)
Dept: CARDIOLOGY | Facility: CLINIC | Age: 75
End: 2025-07-09
Payer: MEDICARE

## 2025-07-09 VITALS
BODY MASS INDEX: 25.76 KG/M2 | DIASTOLIC BLOOD PRESSURE: 70 MMHG | RESPIRATION RATE: 16 BRPM | HEIGHT: 68 IN | HEART RATE: 72 BPM | SYSTOLIC BLOOD PRESSURE: 132 MMHG | WEIGHT: 170 LBS | OXYGEN SATURATION: 97 %

## 2025-07-09 PROCEDURE — G2211 COMPLEX E/M VISIT ADD ON: CPT

## 2025-07-09 PROCEDURE — 99214 OFFICE O/P EST MOD 30 MIN: CPT

## 2025-07-09 PROCEDURE — 93000 ELECTROCARDIOGRAM COMPLETE: CPT

## 2025-07-09 RX ORDER — MECLIZINE HYDROCHLORIDE 25 MG/1
25 TABLET ORAL
Qty: 90 | Refills: 3 | Status: ACTIVE | COMMUNITY
Start: 2025-07-09 | End: 1900-01-01